# Patient Record
Sex: MALE | Race: WHITE | NOT HISPANIC OR LATINO | Employment: FULL TIME | URBAN - METROPOLITAN AREA
[De-identification: names, ages, dates, MRNs, and addresses within clinical notes are randomized per-mention and may not be internally consistent; named-entity substitution may affect disease eponyms.]

---

## 2017-03-04 PROCEDURE — 96374 THER/PROPH/DIAG INJ IV PUSH: CPT

## 2017-03-04 PROCEDURE — 96361 HYDRATE IV INFUSION ADD-ON: CPT | Mod: 59

## 2017-03-04 PROCEDURE — 99285 EMERGENCY DEPT VISIT HI MDM: CPT | Mod: Z6 | Performed by: EMERGENCY MEDICINE

## 2017-03-04 PROCEDURE — 96375 TX/PRO/DX INJ NEW DRUG ADDON: CPT

## 2017-03-04 PROCEDURE — 99285 EMERGENCY DEPT VISIT HI MDM: CPT | Mod: 25

## 2017-03-05 ENCOUNTER — APPOINTMENT (OUTPATIENT)
Dept: CT IMAGING | Facility: CLINIC | Age: 27
End: 2017-03-05
Attending: EMERGENCY MEDICINE
Payer: COMMERCIAL

## 2017-03-05 ENCOUNTER — HOSPITAL ENCOUNTER (EMERGENCY)
Facility: CLINIC | Age: 27
Discharge: HOME | End: 2017-03-05
Attending: EMERGENCY MEDICINE | Admitting: EMERGENCY MEDICINE
Payer: COMMERCIAL

## 2017-03-05 VITALS
HEIGHT: 66 IN | HEART RATE: 96 BPM | SYSTOLIC BLOOD PRESSURE: 124 MMHG | BODY MASS INDEX: 28.88 KG/M2 | WEIGHT: 179.68 LBS | RESPIRATION RATE: 18 BRPM | TEMPERATURE: 98.6 F | DIASTOLIC BLOOD PRESSURE: 84 MMHG | OXYGEN SATURATION: 97 %

## 2017-03-05 DIAGNOSIS — K51.20 ULCERATIVE PROCTITIS WITHOUT COMPLICATION (H): ICD-10-CM

## 2017-03-05 LAB
ALBUMIN SERPL-MCNC: 3.6 G/DL (ref 3.4–5)
ALP SERPL-CCNC: 79 U/L (ref 40–150)
ALT SERPL W P-5'-P-CCNC: 43 U/L (ref 0–70)
ANION GAP SERPL CALCULATED.3IONS-SCNC: 11 MMOL/L (ref 3–14)
AST SERPL W P-5'-P-CCNC: 25 U/L (ref 0–45)
BASOPHILS # BLD AUTO: 0.1 10E9/L (ref 0–0.2)
BASOPHILS NFR BLD AUTO: 1.2 %
BILIRUB SERPL-MCNC: 0.3 MG/DL (ref 0.2–1.3)
BUN SERPL-MCNC: 13 MG/DL (ref 7–30)
CALCIUM SERPL-MCNC: 9 MG/DL (ref 8.5–10.1)
CHLORIDE SERPL-SCNC: 108 MMOL/L (ref 94–109)
CO2 SERPL-SCNC: 24 MMOL/L (ref 20–32)
CREAT SERPL-MCNC: 1.4 MG/DL (ref 0.66–1.25)
CRP SERPL-MCNC: 24.2 MG/L (ref 0–8)
DIFFERENTIAL METHOD BLD: ABNORMAL
EOSINOPHIL # BLD AUTO: 0.8 10E9/L (ref 0–0.7)
EOSINOPHIL NFR BLD AUTO: 7 %
ERYTHROCYTE [DISTWIDTH] IN BLOOD BY AUTOMATED COUNT: 14.1 % (ref 10–15)
ERYTHROCYTE [SEDIMENTATION RATE] IN BLOOD BY WESTERGREN METHOD: 6 MM/H (ref 0–15)
GFR SERPL CREATININE-BSD FRML MDRD: 60 ML/MIN/1.7M2
GLUCOSE SERPL-MCNC: 120 MG/DL (ref 70–99)
HCT VFR BLD AUTO: 41.9 % (ref 40–53)
HGB BLD-MCNC: 14.5 G/DL (ref 13.3–17.7)
IMM GRANULOCYTES # BLD: 0.1 10E9/L (ref 0–0.4)
IMM GRANULOCYTES NFR BLD: 0.4 %
LYMPHOCYTES # BLD AUTO: 2.6 10E9/L (ref 0.8–5.3)
LYMPHOCYTES NFR BLD AUTO: 22.9 %
MCH RBC QN AUTO: 29.7 PG (ref 26.5–33)
MCHC RBC AUTO-ENTMCNC: 34.6 G/DL (ref 31.5–36.5)
MCV RBC AUTO: 86 FL (ref 78–100)
MONOCYTES # BLD AUTO: 1.2 10E9/L (ref 0–1.3)
MONOCYTES NFR BLD AUTO: 10.7 %
NEUTROPHILS # BLD AUTO: 6.5 10E9/L (ref 1.6–8.3)
NEUTROPHILS NFR BLD AUTO: 57.8 %
NRBC # BLD AUTO: 0 10*3/UL
NRBC BLD AUTO-RTO: 0 /100
PLATELET # BLD AUTO: 364 10E9/L (ref 150–450)
POTASSIUM SERPL-SCNC: 3.9 MMOL/L (ref 3.4–5.3)
PROT SERPL-MCNC: 6.8 G/DL (ref 6.8–8.8)
RBC # BLD AUTO: 4.89 10E12/L (ref 4.4–5.9)
SODIUM SERPL-SCNC: 143 MMOL/L (ref 133–144)
WBC # BLD AUTO: 11.3 10E9/L (ref 4–11)

## 2017-03-05 PROCEDURE — 85652 RBC SED RATE AUTOMATED: CPT | Performed by: EMERGENCY MEDICINE

## 2017-03-05 PROCEDURE — 25000128 H RX IP 250 OP 636: Performed by: EMERGENCY MEDICINE

## 2017-03-05 PROCEDURE — 85025 COMPLETE CBC W/AUTO DIFF WBC: CPT | Performed by: EMERGENCY MEDICINE

## 2017-03-05 PROCEDURE — 86140 C-REACTIVE PROTEIN: CPT | Performed by: EMERGENCY MEDICINE

## 2017-03-05 PROCEDURE — 74177 CT ABD & PELVIS W/CONTRAST: CPT

## 2017-03-05 PROCEDURE — 25500064 ZZH RX 255 OP 636: Performed by: EMERGENCY MEDICINE

## 2017-03-05 PROCEDURE — 80053 COMPREHEN METABOLIC PANEL: CPT | Performed by: EMERGENCY MEDICINE

## 2017-03-05 RX ORDER — HYDROCODONE BITARTRATE AND ACETAMINOPHEN 5; 325 MG/1; MG/1
1-2 TABLET ORAL EVERY 4 HOURS PRN
Qty: 15 TABLET | Refills: 0 | Status: SHIPPED | OUTPATIENT
Start: 2017-03-05 | End: 2018-01-20

## 2017-03-05 RX ORDER — ONDANSETRON 2 MG/ML
4 INJECTION INTRAMUSCULAR; INTRAVENOUS EVERY 30 MIN PRN
Status: DISCONTINUED | OUTPATIENT
Start: 2017-03-05 | End: 2017-03-05 | Stop reason: HOSPADM

## 2017-03-05 RX ORDER — FERROUS SULFATE 325(65) MG
325 TABLET ORAL
COMMUNITY
End: 2018-01-20

## 2017-03-05 RX ORDER — HYDROCODONE BITARTRATE AND ACETAMINOPHEN 5; 325 MG/1; MG/1
1-2 TABLET ORAL EVERY 4 HOURS PRN
Qty: 15 TABLET | Refills: 0 | Status: SHIPPED | OUTPATIENT
Start: 2017-03-05 | End: 2017-03-05

## 2017-03-05 RX ORDER — PREDNISONE 10 MG/1
TABLET ORAL
Qty: 32 TABLET | Refills: 0 | Status: SHIPPED | OUTPATIENT
Start: 2017-03-05 | End: 2017-03-15

## 2017-03-05 RX ORDER — SODIUM CHLORIDE 9 MG/ML
1000 INJECTION, SOLUTION INTRAVENOUS CONTINUOUS
Status: DISCONTINUED | OUTPATIENT
Start: 2017-03-05 | End: 2017-03-05 | Stop reason: HOSPADM

## 2017-03-05 RX ORDER — IOPAMIDOL 755 MG/ML
100 INJECTION, SOLUTION INTRAVASCULAR ONCE
Status: COMPLETED | OUTPATIENT
Start: 2017-03-05 | End: 2017-03-05

## 2017-03-05 RX ORDER — LIDOCAINE 40 MG/G
CREAM TOPICAL
Status: DISCONTINUED | OUTPATIENT
Start: 2017-03-05 | End: 2017-03-05 | Stop reason: HOSPADM

## 2017-03-05 RX ORDER — HYDROMORPHONE HYDROCHLORIDE 1 MG/ML
0.5 INJECTION, SOLUTION INTRAMUSCULAR; INTRAVENOUS; SUBCUTANEOUS
Status: DISCONTINUED | OUTPATIENT
Start: 2017-03-05 | End: 2017-03-05 | Stop reason: HOSPADM

## 2017-03-05 RX ADMIN — SODIUM CHLORIDE 1000 ML: 9 INJECTION, SOLUTION INTRAVENOUS at 00:54

## 2017-03-05 RX ADMIN — SODIUM CHLORIDE 1000 ML: 9 INJECTION, SOLUTION INTRAVENOUS at 02:37

## 2017-03-05 RX ADMIN — ONDANSETRON 4 MG: 2 INJECTION INTRAMUSCULAR; INTRAVENOUS at 00:55

## 2017-03-05 RX ADMIN — HYDROMORPHONE HYDROCHLORIDE 0.5 MG: 10 INJECTION, SOLUTION INTRAMUSCULAR; INTRAVENOUS; SUBCUTANEOUS at 00:54

## 2017-03-05 RX ADMIN — IOPAMIDOL 90 ML: 755 INJECTION, SOLUTION INTRAVENOUS at 01:59

## 2017-03-05 ASSESSMENT — ENCOUNTER SYMPTOMS
RECTAL PAIN: 1
BLOOD IN STOOL: 1
DIARRHEA: 1
SHORTNESS OF BREATH: 0
ABDOMINAL PAIN: 1
FEVER: 0

## 2017-03-05 NOTE — ED AVS SNAPSHOT
[facility], Emergency Department    [address]    [facility] [address]    Phone:  [phone]    Fax:  [phone]                                       Cresencio Del Rosario   MRN: [id number]    Department:  [facility], Emergency Department   Date of Visit:  3/4/2017           After Visit Summary Signature Page     I have received my discharge instructions, and my questions have been answered. I have discussed any challenges I see with this plan with the nurse or doctor.    ..........................................................................................................................................  Patient/Patient Representative Signature      ..........................................................................................................................................  Patient Representative Print Name and Relationship to Patient    ..................................................               ................................................  Date                                            Time    ..........................................................................................................................................  Reviewed by Signature/Title    ...................................................              ..............................................  Date                                                            Time

## 2017-03-05 NOTE — ED PROVIDER NOTES
"  History     Chief Complaint   Patient presents with     GI Problem     Rectal pain, h/o colitis     HPI  Cresencio Del Rosario is a 26 year old male with history of ulcerative colitis who presents with rectal pain.  Patient has a history of ulcerative colitis.  Has been having a flare for the last 2 weeks.  This is consistent of crampy abdominal pain and diarrhea and bloody stool.  Now having worsening rectal pain.  Does have a history of proctitis in the past with his flares.  He is on mesalamine for ulcerative colitis.  He does note he is been taking less than his normal dosage as he recently moved here from [place].  He is working on establishing care here.  Is scheduled to see gastroenterology on Tuesday.  Denies any chest pain, shortness breath or lightheadedness.  Has not had any surgery for his ulcerative colitis.  Last colonoscopy was done in [place] and showed pancolitis.  He is not currently on steroids.    I have reviewed the Medications, Allergies, Past Medical and Surgical History, and Social History in the [facility] [address] system.  Past Medical History   Diagnosis Date     Colitis      Ulcer of upper gastrointestinal tract        Past Surgical History   Procedure Laterality Date     Shoulder surgery  2009     Colonoscopy         No family history on file.    Social History   Substance Use Topics     Smoking status: Former Smoker     Smokeless tobacco: Not on file     Alcohol use No       Review of Systems   Constitutional: Negative for fever.   Respiratory: Negative for shortness of breath.    Cardiovascular: Negative for chest pain.   Gastrointestinal: Positive for abdominal pain, blood in stool, diarrhea and rectal pain.   All other systems reviewed and are negative.      Physical Exam   BP: 127/76  Pulse: 96  Temp: 99.1  F (37.3  C)  Resp: 18  Height: 167.6 cm (5' 6\")  Weight: 81.5 kg (179 lb 10.8 oz)  SpO2: 97 %  Physical Exam  Vital Signs and Nursing Notes Reviewed.  General:  NAD  HEENT: Oropharynx " clear.  No obvious signs of trauma.  PERRL.  EOMI  Neck: Supple.  No lymphadenopathy.  Cardiac: RRR.  No murmurs, rubs or gallops  Lungs: Clear bilaterally.  Normal respiratory rate.    Abdomen:  Soft, Nontender, no rebound or guarding.  Rectal:  Tender over superior aspect of rectum.     Back: No CVA tenderness.  Skin:  No rash.  No diaphoresis  Extremities:  No cyanosis, clubbing, or edema.  Neurological:  CN II-XII intact, Strength intact, Sensation intact, No pronator drift, Normal gait.  Pulse:  Symmetric in bilateral upper and lower extremities.   ED Course     ED Course     Procedures             Critical Care time:  none     Results for orders placed or performed during the hospital encounter of 03/05/17   CT Abdomen Pelvis w Contrast    Narrative    CT ABDOMEN PELVIS WITH CONTRAST   3/5/2017 1:51 AM     HISTORY: Rectal pain. History of ulcerative colitis. Evaluate for  abscess.    TECHNIQUE: 90 mL Isovue-370 IV were administered. After contrast  administration, volumetric helical sections were acquired from the  lung bases to the ischial tuberosities. Coronal images were also  reconstructed. Radiation dose for this scan was reduced using  automated exposure control, adjustment of the mA and/or kV according  to patient size, or iterative reconstruction technique.    COMPARISON: None.     FINDINGS: There is mild bowel wall thickening involving the sigmoid  colon and rectum, most likely infectious or inflammatory in etiology.  The remainder of the colon is unremarkable. Unremarkable appendix. No  free fluid in the pelvis. No bowel obstruction. No fluid collections  in the abdomen or pelvis to suggest abscess. Scattered mildly  prominent lymph nodes are noted in the mesentery adjacent to the  sigmoid colon. Diffuse fatty infiltration of the liver. The  gallbladder, spleen, adrenal glands, pancreas and kidneys are  unremarkable. No hydronephrosis.  The visualized lung bases are clear.      Impression     IMPRESSION:  1. Mild bowel wall thickening involving the sigmoid colon and rectum,  most likely infectious or inflammatory in etiology. Inflammation  related to ulcerative colitis could have this appearance.  2. No evidence for abscess.  3. Diffuse fatty infiltration of the liver.    KELL JAIMES MD   CBC with platelets differential   Result Value Ref Range    WBC 11.3 (H) 4.0 - 11.0 10e9/L    RBC Count 4.89 4.4 - 5.9 10e12/L    Hemoglobin 14.5 13.3 - 17.7 g/dL    Hematocrit 41.9 40.0 - 53.0 %    MCV 86 78 - 100 fl    MCH 29.7 26.5 - 33.0 pg    MCHC 34.6 31.5 - 36.5 g/dL    RDW 14.1 10.0 - 15.0 %    Platelet Count 364 150 - 450 10e9/L    Diff Method Automated Method     % Neutrophils 57.8 %    % Lymphocytes 22.9 %    % Monocytes 10.7 %    % Eosinophils 7.0 %    % Basophils 1.2 %    % Immature Granulocytes 0.4 %    Nucleated RBCs 0 0 /100    Absolute Neutrophil 6.5 1.6 - 8.3 10e9/L    Absolute Lymphocytes 2.6 0.8 - 5.3 10e9/L    Absolute Monocytes 1.2 0.0 - 1.3 10e9/L    Absolute Eosinophils 0.8 (H) 0.0 - 0.7 10e9/L    Absolute Basophils 0.1 0.0 - 0.2 10e9/L    Abs Immature Granulocytes 0.1 0 - 0.4 10e9/L    Absolute Nucleated RBC 0.0    CRP inflammation   Result Value Ref Range    CRP Inflammation 24.2 (H) 0.0 - 8.0 mg/L   Erythrocyte sedimentation rate auto   Result Value Ref Range    Sed Rate 6 0 - 15 mm/h   Comprehensive metabolic panel   Result Value Ref Range    Sodium 143 133 - 144 mmol/L    Potassium 3.9 3.4 - 5.3 mmol/L    Chloride 108 94 - 109 mmol/L    Carbon Dioxide 24 20 - 32 mmol/L    Anion Gap 11 3 - 14 mmol/L    Glucose 120 (H) 70 - 99 mg/dL    Urea Nitrogen 13 7 - 30 mg/dL    Creatinine 1.40 (H) 0.66 - 1.25 mg/dL    GFR Estimate 60 (L) >60 mL/min/1.7m2    GFR Estimate If Black 73 >60 mL/min/1.7m2    Calcium 9.0 8.5 - 10.1 mg/dL    Bilirubin Total 0.3 0.2 - 1.3 mg/dL    Albumin 3.6 3.4 - 5.0 g/dL    Protein Total 6.8 6.8 - 8.8 g/dL    Alkaline Phosphatase 79 40 - 150 U/L    ALT 43 0 - 70 U/L     AST 25 0 - 45 U/L                 Labs Ordered and Resulted from Time of ED Arrival Up to the Time of Departure from the ED   CBC WITH PLATELETS DIFFERENTIAL - Abnormal; Notable for the following:        Result Value    WBC 11.3 (*)     Absolute Eosinophils 0.8 (*)     All other components within normal limits   CRP INFLAMMATION - Abnormal; Notable for the following:     CRP Inflammation 24.2 (*)     All other components within normal limits   COMPREHENSIVE METABOLIC PANEL - Abnormal; Notable for the following:     Glucose 120 (*)     Creatinine 1.40 (*)     GFR Estimate 60 (*)     All other components within normal limits   ERYTHROCYTE SEDIMENTATION RATE AUTO   PERIPHERAL IV CATHETER       Assessments & Plan (with Medical Decision Making)  26 year old with history of ulcerative colitis who comes in with rectal pain.  Labs are notable for an elevated CRP and white count.  Creatinine slightly elevated at 1.4.  Given IV fluids for this.  Did get a CT scan of his abdomen.  No obvious signs of abscess.  There is signs of rectosigmoiditis.  Patient started on prednisone.  He will follow-up with GI on Tuesday.  He will return for any worsening symptoms.  Patient states understanding of discharge home.         I have reviewed the nursing notes.    I have reviewed the findings, diagnosis, plan and need for follow up with the patient.    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

## 2017-03-05 NOTE — DISCHARGE INSTRUCTIONS
Discharge Instructions for Ulcerative Colitis  You have been diagnosed with ulcerative colitis. Ulcerative colitis is inflammation (irritation and swelling) that occurs in the rectum and colon. It is a form of inflammatory bowel disease (IBD). No one knows what causes IBD, but the symptoms can be treated. People with IBD can lead full, active lives.  Home care    Follow the diet that was prescribed for you in the hospital.    Avoid any foods that make your symptoms worse. These foods vary from person to person.    Keep a diary of foods that disagree with you and share this information with your doctor or nutritionist.    Take your medications as directed. The doctor may ask you to take several different types.    Talk to your doctor about the need for surgery. Some patients need to have their colon removed. This treatment has side effects. Only you and your doctor can make this decision.  Follow-up care  Make a follow-up appointment as directed by our staff.  When to seek medical care  Call your doctor immediately if you have any of the following:    Bleeding from your rectum    Worsening pain, new pain, or cramping in your abdomen    Bloody diarrhea    Fever above 101 F (38.0 C)    Weight loss    Nausea    Vomiting     3678-9541 [employer]. [address] [place] [address]. All rights reserved. This information is not intended as a substitute for professional medical care. Always follow your healthcare professional's instructions.

## 2017-03-05 NOTE — ED AVS SNAPSHOT
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        Discharge Instructions         Discharge Instructions for Ulcerative Colitis  You have been diagnosed with ulcerative colitis. Ulcerative colitis is inflammation (irritation and swelling) that occurs in the rectum and colon. It is a form of inflammatory bowel disease (IBD). No one knows what causes IBD, but the symptoms can be treated. People with IBD can lead full, active lives.  Home care    Follow the diet that was prescribed for you in the hospital.    Avoid any foods that make your symptoms worse. These foods vary from person to person.    Keep a diary of foods that disagree with you and share this information with your doctor or nutritionist.    Take your medications as directed. The doctor may ask you to take several different types.    Talk to your doctor about the need for surgery. Some patients need to have their colon removed. This treatment has side effects. Only you and your doctor can make this decision.  Follow-up care  Make a follow-up appointment as directed by our staff.  When to seek medical care  Call your doctor immediately if you have any of the following:    Bleeding from your rectum    Worsening pain, new pain, or cramping in your abdomen    Bloody diarrhea    Fever above 101 F (38.0 C)    Weight loss    Nausea    Vomiting     5865-5180 [employer]. [address] [place] [address], [place] [address]. All rights reserved. This information is not intended as a substitute for professional medical care.  Always follow your healthcare professional's instructions.          24 Hour Appointment Hotline       To make an appointment at any [facility], call [phone] ([phone]). If you don't have a family doctor or clinic, we will help you find one. [facility] clinics are conveniently located to serve the needs of you and your family.             Review of your medicines      START taking        Dose / Directions Last dose taken    HYDROcodone-acetaminophen 5-325 MG per tablet   Commonly known as:  NORCO   Dose:  1-2 tablet   Quantity:  15 tablet        Take 1-2 tablets by mouth every 4 hours as needed for moderate to severe pain   Refills:  0        predniSONE 10 MG tablet   Commonly known as:  DELTASONE   Quantity:  32 tablet        Take 4 tablets daily for 5 days,  take 2 tablets daily for 3 days, take 1 tablet daily for 3 days, take half a tablet for 3 days.   Refills:  0          Our records show that you are taking the medicines listed below. If these are incorrect, please call your family doctor or clinic.        Dose / Directions Last dose taken    ferrous sulfate 325 (65 FE) MG tablet   Commonly known as:  IRON   Dose:  325 mg        Take 325 mg by mouth daily (with breakfast)   Refills:  0        MESALAMINE PO   Dose:  1.2 g        Take 1.2 g by mouth 2 times daily   Refills:  0                Prescriptions were sent or printed at these locations (2 Prescriptions)                   Other Prescriptions                Printed at Department/Unit printer (2 of 2)         predniSONE (DELTASONE) 10 MG tablet               HYDROcodone-acetaminophen (NORCO) 5-325 MG per tablet                Procedures and tests performed during your visit     CBC with platelets differential    CRP inflammation    CT Abdomen Pelvis w Contrast    Comprehensive metabolic panel    Erythrocyte sedimentation rate auto    Peripheral IV catheter      Orders Needing Specimen Collection     None      Pending Results     Date and  "Time Order Name Status Description    3/5/2017 0055 CT Abdomen Pelvis w Contrast Preliminary             Pending Culture Results     No orders found from 3/3/2017 to 3/6/2017.            Thank you for choosing [facility]       Thank you for choosing [facility] for your care. Our goal is always to provide you with excellent care. Hearing back from our patients is one way we can continue to improve our services. Please take a few minutes to complete the written survey that you may receive in the mail after you visit with us. Thank you!        [employer] Information     [employer] lets you send messages to your doctor, view your test results, renew your prescriptions, schedule appointments and more. To sign up, go to [email or web address] . Click on \"Log in\" on the left side of the screen, which will take you to the Welcome page. Then click on \"Sign up Now\" on the right side of the page.     You will be asked to enter the access code listed below, as well as some personal information. Please follow the directions to create your username and password.     Your access code is: [email or web address]  Expires: 6/3/2017  2:47 AM     Your access code will  in 90 days. If you need help or a new code, please call your [facility] clinic or [phone].        Care EveryWhere ID     This is your Care EveryWhere ID. This could be used by other organizations to access your [facility] medical records  [id number]        After Visit Summary       This is your record. Keep this with you and show to your community pharmacist(s) and doctor(s) at your next visit.                  "

## 2018-01-19 ENCOUNTER — HOSPITAL ENCOUNTER (INPATIENT)
Facility: CLINIC | Age: 28
LOS: 3 days | Discharge: HOME | End: 2018-01-23
Attending: EMERGENCY MEDICINE | Admitting: STUDENT IN AN ORGANIZED HEALTH CARE EDUCATION/TRAINING PROGRAM
Payer: COMMERCIAL

## 2018-01-19 DIAGNOSIS — Z87.11 HISTORY OF PEPTIC ULCER DISEASE: Primary | ICD-10-CM

## 2018-01-19 DIAGNOSIS — K51.90 ULCERATIVE COLITIS WITHOUT COMPLICATIONS, UNSPECIFIED LOCATION (H): ICD-10-CM

## 2018-01-19 DIAGNOSIS — R11.2 NAUSEA AND VOMITING, INTRACTABILITY OF VOMITING NOT SPECIFIED, UNSPECIFIED VOMITING TYPE: ICD-10-CM

## 2018-01-19 DIAGNOSIS — E86.0 DEHYDRATION: ICD-10-CM

## 2018-01-19 DIAGNOSIS — R19.7 DIARRHEA, UNSPECIFIED TYPE: ICD-10-CM

## 2018-01-19 PROCEDURE — 93010 ELECTROCARDIOGRAM REPORT: CPT | Mod: Z6 | Performed by: EMERGENCY MEDICINE

## 2018-01-19 PROCEDURE — 96372 THER/PROPH/DIAG INJ SC/IM: CPT

## 2018-01-19 PROCEDURE — 96367 TX/PROPH/DG ADDL SEQ IV INF: CPT

## 2018-01-19 PROCEDURE — 96361 HYDRATE IV INFUSION ADD-ON: CPT

## 2018-01-19 PROCEDURE — 96376 TX/PRO/DX INJ SAME DRUG ADON: CPT

## 2018-01-19 PROCEDURE — 99285 EMERGENCY DEPT VISIT HI MDM: CPT | Mod: 25

## 2018-01-19 PROCEDURE — 99285 EMERGENCY DEPT VISIT HI MDM: CPT | Mod: 25 | Performed by: EMERGENCY MEDICINE

## 2018-01-19 PROCEDURE — 96375 TX/PRO/DX INJ NEW DRUG ADDON: CPT

## 2018-01-19 PROCEDURE — 96365 THER/PROPH/DIAG IV INF INIT: CPT

## 2018-01-19 PROCEDURE — 96366 THER/PROPH/DIAG IV INF ADDON: CPT

## 2018-01-19 NOTE — LETTER
[address] [address]  Phone: [phone]    January 23, 2018      To whom it may concern:    Cresencio Del Rosario was in the hospital from 1/20/18 - 1/23/18. He will be able to return to school on 1/25/18.      Please contact me for questions or concerns.      Sincerely,          Mahin Camarena MD

## 2018-01-19 NOTE — IP AVS SNAPSHOT
MRN:[id number]                      After Visit Summary   1/19/2018    Cresencio Del Rosario    MRN: [id number]           Thank you!     Thank you for choosing [facility] for your care. Our goal is always to provide you with excellent care. Hearing back from our patients is one way we can continue to improve our services. Please take a few minutes to complete the written survey that you may receive in the mail after you visit with us. Thank you!        Patient Information     Date Of Birth          1990        Designated Caregiver       Most Recent Value    Caregiver    Will someone help with your care after discharge? no      About your hospital stay     You were admitted on:  January 20, 2018 You last received care in the:  [address] [facility]    You were discharged on:  January 23, 2018        Reason for your hospital stay       You were in the hospital due to dehydration related to a norovirus infection.                  Who to Call     For medical emergencies, please call 911.  For non-urgent questions about your medical care, please call your primary care provider or clinic, [phone]          Attending Provider     Provider Specialty    Lakia Peacock MD Emergency Medicine    [facility], Christy Bermudez MD Emergency Medicine    [facility], Ayaz Stewart MD Internal Medicine    Anton, Cain Kirkpatrick MD Internal Medicine    uH[employer], Grace ALMANZAR MD Internal Medicine       Primary Care Provider Office Phone # Fax #    [facility] [phone]      After Care Instructions     Activity       Your activity upon discharge: activity as tolerated            Diet       Follow this diet upon discharge: Orders Placed This Encounter      Regular Diet Adult            Discharge Instructions       Continue your home therapies for ulcerative colitis. You should also take daily omeprazole for the next 6-8 weeks (prescribed on discharge). You can make an appointment with you  "primary care doctor in the next 1-2 weeks and GI as previously planned.                  Follow-up Appointments     Follow Up and recommended labs and tests       Follow up with [facility] PCP in 1-2 weeks and as previously planned with [facility] GI                  Pending Results     Date and Time Order Name Status Description    2018 1026 Blood culture Preliminary     2018 1026 Blood culture Preliminary     2018 0804 Blood culture Preliminary     2018 0804 Blood culture Preliminary             Statement of Approval     Ordered          18 1309  I have reviewed and agree with all the recommendations and orders detailed in this document.  EFFECTIVE NOW     Approved and electronically signed by:  Mahin Camarena MD             Admission Information     Date & Time Provider Department Dept. Phone    2018 Grace Arias MD [address] [facility] [phone]      Your Vitals Were     Blood Pressure Pulse Temperature Respirations Height Weight    120/92 (BP Location: Left arm) 83 97.3  F (36.3  C) (Oral) 16 1.676 m (5' 6\") 87.9 kg (193 lb 11.2 oz)    Pulse Oximetry BMI (Body Mass Index)                96% 31.26 kg/m2          [employer] Information     [employer] lets you send messages to your doctor, view your test results, renew your prescriptions, schedule appointments and more. To sign up, go to [email or web address] . Click on \"Log in\" on the left side of the screen, which will take you to the Welcome page. Then click on \"Sign up Now\" on the right side of the page.     You will be asked to enter the access code listed below, as well as some personal information. Please follow the directions to create your username and password.     Your access code is: [email or web address]  Expires: 2018  1:11 PM     Your access code will  in 90 days. If you need help or a new code, please call your [facility] clinic or [phone].        Care EveryWhere ID     This is your Care " EveryWhere ID. This could be used by other organizations to access your [facility] medical records  [id number]        Equal Access to Services     NIDIA GRAHAM : Franklyn Buitrago, wabrunada nikkieadaha, kinaluz mccormickbrandonda anne mariegiuseppemame, waxrebecca emily eldaairam gongorahi soraidatima yee marcus. So [facility] [phone].    ATENCIÓN: Si habla español, tiene a cain disposición servicios gratuitos de asistencia lingüística. Santaame al [phone].    We comply with applicable federal civil rights laws and [place] laws. We do not discriminate on the basis of race, color, national origin, age, disability, sex, sexual orientation, or gender identity.               Review of your medicines      CONTINUE these medicines which may have CHANGED, or have new prescriptions. If we are uncertain of the size of tablets/capsules you have at home, strength may be listed as something that might have changed.        Dose / Directions    omeprazole 20 MG CR capsule   Commonly known as:  priLOSEC   This may have changed:    - medication strength  - how much to take   Used for:  History of peptic ulcer disease        Dose:  20 mg   Take 1 capsule (20 mg) by mouth daily   Quantity:  30 capsule   Refills:  1         CONTINUE these medicines which have NOT CHANGED        Dose / Directions    * LIALDA 1.2 G EC tablet   Generic drug:  mesalamine        Dose:  2400 mg   Take 2,400 mg by mouth daily (with breakfast)   Refills:  0       * mesalamine 1000 MG Suppository   Commonly known as:  CANASA        Dose:  1000 mg   Place 1,000 mg rectally nightly as needed   Refills:  0       * Notice:  This list has 2 medication(s) that are the same as other medications prescribed for you. Read the directions carefully, and ask your doctor or other care provider to review them with you.         Where to get your medicines      These medications were sent to [facility] [place], [place] - [address]  [address] [facility] [address] [address]      Phone:  [phone]     omeprazole 20 MG CR capsule                Protect others around you: Learn how to safely use, store and throw away your medicines at www.disposemymeds.org.             Medication List: This is a list of all your medications and when to take them. Check marks below indicate your daily home schedule. Keep this list as a reference.      Medications           Morning Afternoon Evening Bedtime As Needed    * LIALDA 1.2 G EC tablet   Take 2,400 mg by mouth daily (with breakfast)   Last time this was given:  2,400 mg on 1/23/2018  9:32 AM   Generic drug:  mesalamine                                * mesalamine 1000 MG Suppository   Commonly known as:  CANASA   Place 1,000 mg rectally nightly as needed                                omeprazole 20 MG CR capsule   Commonly known as:  priLOSEC   Take 1 capsule (20 mg) by mouth daily                                * Notice:  This list has 2 medication(s) that are the same as other medications prescribed for you. Read the directions carefully, and ask your doctor or other care provider to review them with you.

## 2018-01-19 NOTE — IP AVS SNAPSHOT
[address] [address]    Phone:  [phone]                                       After Visit Summary   1/19/2018    Cresencio Del Rosario    MRN: [id number]           After Visit Summary Signature Page     I have received my discharge instructions, and my questions have been answered. I have discussed any challenges I see with this plan with the nurse or doctor.    ..........................................................................................................................................  Patient/Patient Representative Signature      ..........................................................................................................................................  Patient Representative Print Name and Relationship to Patient    ..................................................               ................................................  Date                                            Time    ..........................................................................................................................................  Reviewed by Signature/Title    ...................................................              ..............................................  Date                                                            Time

## 2018-01-19 NOTE — LETTER
Transition Communication Hand-off for Care Transitions to Next Level of Care Provider    Name: Cresencio Del Rosario  MRN #: [id number]  [facility]     Primary Clinic: [address]  [facility] [address]     Reason for Hospitalization:  Dehydration [E86.0]  Ulcerative colitis without complications, unspecified location (H) [K51.90]  Diarrhea, unspecified type [R19.7]  Nausea and vomiting, intractability of vomiting not specified, unspecified vomiting type [R11.2]  Admit Date/Time: 1/19/2018 11:59 PM  Discharge Date: 1/23/18  Payor Source: Payor: BLUE PLUS / Plan: BLUE PLUS MA / Product Type: HMO /     Reason for Communication Hand-off Referral: continuation of cares    Discharge Plan: home       Concern for non-adherence with plan of care:   Y/N n  Discharge Needs Assessment:      Already enrolled in Tele-monitoring program and name of program:    Follow-up specialty is recommended: Yes    Follow-up plan:  No future appointments.    Any outstanding tests or procedures:              Key Recommendations:      Britt Romo    AVS/Discharge Summary is the source of truth; this is a helpful guide for improved communication of patient story

## 2018-01-20 ENCOUNTER — APPOINTMENT (OUTPATIENT)
Dept: CT IMAGING | Facility: CLINIC | Age: 28
End: 2018-01-20
Attending: EMERGENCY MEDICINE
Payer: COMMERCIAL

## 2018-01-20 PROBLEM — A08.11 NOROVIRUS: Status: ACTIVE | Noted: 2018-01-20

## 2018-01-20 LAB
ALBUMIN SERPL-MCNC: 3.3 G/DL (ref 3.4–5)
ALBUMIN SERPL-MCNC: 4.2 G/DL (ref 3.4–5)
ALBUMIN UR-MCNC: NEGATIVE MG/DL
ALP SERPL-CCNC: 123 U/L (ref 40–150)
ALP SERPL-CCNC: 95 U/L (ref 40–150)
ALT SERPL W P-5'-P-CCNC: 49 U/L (ref 0–70)
ALT SERPL W P-5'-P-CCNC: 62 U/L (ref 0–70)
ANION GAP SERPL CALCULATED.3IONS-SCNC: 10 MMOL/L (ref 3–14)
ANION GAP SERPL CALCULATED.3IONS-SCNC: 11 MMOL/L (ref 3–14)
ANION GAP SERPL CALCULATED.3IONS-SCNC: 12 MMOL/L (ref 3–14)
APPEARANCE UR: CLEAR
AST SERPL W P-5'-P-CCNC: 21 U/L (ref 0–45)
AST SERPL W P-5'-P-CCNC: 32 U/L (ref 0–45)
BASOPHILS # BLD AUTO: 0.1 10E9/L (ref 0–0.2)
BASOPHILS # BLD AUTO: 0.1 10E9/L (ref 0–0.2)
BASOPHILS NFR BLD AUTO: 0.2 %
BASOPHILS NFR BLD AUTO: 0.5 %
BILIRUB SERPL-MCNC: 0.4 MG/DL (ref 0.2–1.3)
BILIRUB SERPL-MCNC: 0.6 MG/DL (ref 0.2–1.3)
BILIRUB UR QL STRIP: NEGATIVE
BUN SERPL-MCNC: 15 MG/DL (ref 7–30)
BUN SERPL-MCNC: 19 MG/DL (ref 7–30)
BUN SERPL-MCNC: 21 MG/DL (ref 7–30)
C COLI+JEJUNI+LARI FUSA STL QL NAA+PROBE: NOT DETECTED
C DIFF TOX B STL QL: NEGATIVE
CALCIUM SERPL-MCNC: 7.7 MG/DL (ref 8.5–10.1)
CALCIUM SERPL-MCNC: 7.8 MG/DL (ref 8.5–10.1)
CALCIUM SERPL-MCNC: 9.2 MG/DL (ref 8.5–10.1)
CHLORIDE SERPL-SCNC: 109 MMOL/L (ref 94–109)
CHLORIDE SERPL-SCNC: 115 MMOL/L (ref 94–109)
CHLORIDE SERPL-SCNC: 117 MMOL/L (ref 94–109)
CO2 SERPL-SCNC: 17 MMOL/L (ref 20–32)
CO2 SERPL-SCNC: 18 MMOL/L (ref 20–32)
CO2 SERPL-SCNC: 21 MMOL/L (ref 20–32)
COLOR UR AUTO: YELLOW
CREAT SERPL-MCNC: 0.88 MG/DL (ref 0.66–1.25)
CREAT SERPL-MCNC: 0.96 MG/DL (ref 0.66–1.25)
CREAT SERPL-MCNC: 0.97 MG/DL (ref 0.66–1.25)
CRP SERPL-MCNC: 140 MG/L (ref 0–8)
DIFFERENTIAL METHOD BLD: ABNORMAL
DIFFERENTIAL METHOD BLD: ABNORMAL
EC STX1 GENE STL QL NAA+PROBE: NOT DETECTED
EC STX2 GENE STL QL NAA+PROBE: NOT DETECTED
ENTERIC PATHOGEN COMMENT: ABNORMAL
EOSINOPHIL # BLD AUTO: 0 10E9/L (ref 0–0.7)
EOSINOPHIL # BLD AUTO: 0.3 10E9/L (ref 0–0.7)
EOSINOPHIL NFR BLD AUTO: 0 %
EOSINOPHIL NFR BLD AUTO: 1.2 %
ERYTHROCYTE [DISTWIDTH] IN BLOOD BY AUTOMATED COUNT: 16.5 % (ref 10–15)
ERYTHROCYTE [DISTWIDTH] IN BLOOD BY AUTOMATED COUNT: 16.8 % (ref 10–15)
ERYTHROCYTE [DISTWIDTH] IN BLOOD BY AUTOMATED COUNT: 17.5 % (ref 10–15)
ERYTHROCYTE [SEDIMENTATION RATE] IN BLOOD BY WESTERGREN METHOD: 3 MM/H (ref 0–15)
FLUAV+FLUBV AG SPEC QL: NEGATIVE
FLUAV+FLUBV AG SPEC QL: NEGATIVE
GFR SERPL CREATININE-BSD FRML MDRD: >90 ML/MIN/1.7M2
GLUCOSE SERPL-MCNC: 106 MG/DL (ref 70–99)
GLUCOSE SERPL-MCNC: 134 MG/DL (ref 70–99)
GLUCOSE SERPL-MCNC: 160 MG/DL (ref 70–99)
GLUCOSE UR STRIP-MCNC: NEGATIVE MG/DL
HCT VFR BLD AUTO: 42.8 % (ref 40–53)
HCT VFR BLD AUTO: 45.6 % (ref 40–53)
HCT VFR BLD AUTO: 46.6 % (ref 40–53)
HGB BLD-MCNC: 13.7 G/DL (ref 13.3–17.7)
HGB BLD-MCNC: 15.1 G/DL (ref 13.3–17.7)
HGB BLD-MCNC: 15.5 G/DL (ref 13.3–17.7)
HGB UR QL STRIP: NEGATIVE
HYALINE CASTS #/AREA URNS LPF: 1 /LPF (ref 0–2)
IMM GRANULOCYTES # BLD: 0.1 10E9/L (ref 0–0.4)
IMM GRANULOCYTES # BLD: 0.1 10E9/L (ref 0–0.4)
IMM GRANULOCYTES NFR BLD: 0.4 %
IMM GRANULOCYTES NFR BLD: 0.5 %
INR PPP: 1.44 (ref 0.86–1.14)
KETONES UR STRIP-MCNC: NEGATIVE MG/DL
LACTATE BLD-SCNC: 1.7 MMOL/L (ref 0.7–2)
LACTATE BLD-SCNC: 3.5 MMOL/L (ref 0.7–2)
LEUKOCYTE ESTERASE UR QL STRIP: NEGATIVE
LIPASE SERPL-CCNC: 376 U/L (ref 73–393)
LYMPHOCYTES # BLD AUTO: 0.9 10E9/L (ref 0.8–5.3)
LYMPHOCYTES # BLD AUTO: 1.8 10E9/L (ref 0.8–5.3)
LYMPHOCYTES NFR BLD AUTO: 3.9 %
LYMPHOCYTES NFR BLD AUTO: 8.2 %
MAGNESIUM SERPL-MCNC: 1.4 MG/DL (ref 1.6–2.3)
MAGNESIUM SERPL-MCNC: 1.5 MG/DL (ref 1.6–2.3)
MAGNESIUM SERPL-MCNC: 1.8 MG/DL (ref 1.6–2.3)
MCH RBC QN AUTO: 27.1 PG (ref 26.5–33)
MCH RBC QN AUTO: 27.3 PG (ref 26.5–33)
MCH RBC QN AUTO: 27.8 PG (ref 26.5–33)
MCHC RBC AUTO-ENTMCNC: 32 G/DL (ref 31.5–36.5)
MCHC RBC AUTO-ENTMCNC: 33.1 G/DL (ref 31.5–36.5)
MCHC RBC AUTO-ENTMCNC: 33.3 G/DL (ref 31.5–36.5)
MCV RBC AUTO: 82 FL (ref 78–100)
MCV RBC AUTO: 84 FL (ref 78–100)
MCV RBC AUTO: 85 FL (ref 78–100)
MONOCYTES # BLD AUTO: 0.8 10E9/L (ref 0–1.3)
MONOCYTES # BLD AUTO: 1.5 10E9/L (ref 0–1.3)
MONOCYTES NFR BLD AUTO: 3.5 %
MONOCYTES NFR BLD AUTO: 6.8 %
MUCOUS THREADS #/AREA URNS LPF: PRESENT /LPF
NEUTROPHILS # BLD AUTO: 17.9 10E9/L (ref 1.6–8.3)
NEUTROPHILS # BLD AUTO: 20.5 10E9/L (ref 1.6–8.3)
NEUTROPHILS NFR BLD AUTO: 82.8 %
NEUTROPHILS NFR BLD AUTO: 92 %
NITRATE UR QL: NEGATIVE
NOROV GI+II ORF1-ORF2 JNC STL QL NAA+PR: ABNORMAL
NRBC # BLD AUTO: 0 10*3/UL
NRBC # BLD AUTO: 0 10*3/UL
NRBC BLD AUTO-RTO: 0 /100
NRBC BLD AUTO-RTO: 0 /100
PH UR STRIP: 5 PH (ref 5–7)
PLATELET # BLD AUTO: 293 10E9/L (ref 150–450)
PLATELET # BLD AUTO: 331 10E9/L (ref 150–450)
PLATELET # BLD AUTO: 420 10E9/L (ref 150–450)
POTASSIUM SERPL-SCNC: 3.3 MMOL/L (ref 3.4–5.3)
POTASSIUM SERPL-SCNC: 3.7 MMOL/L (ref 3.4–5.3)
POTASSIUM SERPL-SCNC: 4 MMOL/L (ref 3.4–5.3)
POTASSIUM SERPL-SCNC: 4.4 MMOL/L (ref 3.4–5.3)
PROT SERPL-MCNC: 6.6 G/DL (ref 6.8–8.8)
PROT SERPL-MCNC: 7.9 G/DL (ref 6.8–8.8)
RBC # BLD AUTO: 5.02 10E12/L (ref 4.4–5.9)
RBC # BLD AUTO: 5.57 10E12/L (ref 4.4–5.9)
RBC # BLD AUTO: 5.58 10E12/L (ref 4.4–5.9)
RBC #/AREA URNS AUTO: <1 /HPF (ref 0–2)
RVA NSP5 STL QL NAA+PROBE: NOT DETECTED
SALMONELLA SP RPOD STL QL NAA+PROBE: NOT DETECTED
SHIGELLA SP+EIEC IPAH STL QL NAA+PROBE: NOT DETECTED
SODIUM SERPL-SCNC: 142 MMOL/L (ref 133–144)
SODIUM SERPL-SCNC: 143 MMOL/L (ref 133–144)
SODIUM SERPL-SCNC: 145 MMOL/L (ref 133–144)
SOURCE: ABNORMAL
SP GR UR STRIP: 1.03 (ref 1–1.03)
SPECIMEN SOURCE: NORMAL
SPECIMEN SOURCE: NORMAL
UROBILINOGEN UR STRIP-MCNC: NORMAL MG/DL (ref 0–2)
V CHOL+PARA RFBL+TRKH+TNAA STL QL NAA+PR: NOT DETECTED
WBC # BLD AUTO: 13.9 10E9/L (ref 4–11)
WBC # BLD AUTO: 21.6 10E9/L (ref 4–11)
WBC # BLD AUTO: 22.3 10E9/L (ref 4–11)
WBC #/AREA URNS AUTO: 1 /HPF (ref 0–2)
Y ENTERO RECN STL QL NAA+PROBE: NOT DETECTED

## 2018-01-20 PROCEDURE — 25000132 ZZH RX MED GY IP 250 OP 250 PS 637: Performed by: STUDENT IN AN ORGANIZED HEALTH CARE EDUCATION/TRAINING PROGRAM

## 2018-01-20 PROCEDURE — 99223 1ST HOSP IP/OBS HIGH 75: CPT | Mod: AI | Performed by: STUDENT IN AN ORGANIZED HEALTH CARE EDUCATION/TRAINING PROGRAM

## 2018-01-20 PROCEDURE — 87804 INFLUENZA ASSAY W/OPTIC: CPT | Performed by: EMERGENCY MEDICINE

## 2018-01-20 PROCEDURE — 36415 COLL VENOUS BLD VENIPUNCTURE: CPT | Performed by: STUDENT IN AN ORGANIZED HEALTH CARE EDUCATION/TRAINING PROGRAM

## 2018-01-20 PROCEDURE — 40000802 ZZH SITE CHECK

## 2018-01-20 PROCEDURE — 85652 RBC SED RATE AUTOMATED: CPT | Performed by: STUDENT IN AN ORGANIZED HEALTH CARE EDUCATION/TRAINING PROGRAM

## 2018-01-20 PROCEDURE — 25000128 H RX IP 250 OP 636: Performed by: EMERGENCY MEDICINE

## 2018-01-20 PROCEDURE — 85027 COMPLETE CBC AUTOMATED: CPT | Performed by: STUDENT IN AN ORGANIZED HEALTH CARE EDUCATION/TRAINING PROGRAM

## 2018-01-20 PROCEDURE — 80048 BASIC METABOLIC PNL TOTAL CA: CPT | Performed by: STUDENT IN AN ORGANIZED HEALTH CARE EDUCATION/TRAINING PROGRAM

## 2018-01-20 PROCEDURE — 83735 ASSAY OF MAGNESIUM: CPT | Performed by: STUDENT IN AN ORGANIZED HEALTH CARE EDUCATION/TRAINING PROGRAM

## 2018-01-20 PROCEDURE — 81001 URINALYSIS AUTO W/SCOPE: CPT | Performed by: EMERGENCY MEDICINE

## 2018-01-20 PROCEDURE — 25000125 ZZHC RX 250: Performed by: EMERGENCY MEDICINE

## 2018-01-20 PROCEDURE — 83605 ASSAY OF LACTIC ACID: CPT | Performed by: STUDENT IN AN ORGANIZED HEALTH CARE EDUCATION/TRAINING PROGRAM

## 2018-01-20 PROCEDURE — 25000132 ZZH RX MED GY IP 250 OP 250 PS 637: Performed by: EMERGENCY MEDICINE

## 2018-01-20 PROCEDURE — 83690 ASSAY OF LIPASE: CPT | Performed by: EMERGENCY MEDICINE

## 2018-01-20 PROCEDURE — 85610 PROTHROMBIN TIME: CPT | Performed by: STUDENT IN AN ORGANIZED HEALTH CARE EDUCATION/TRAINING PROGRAM

## 2018-01-20 PROCEDURE — 25000128 H RX IP 250 OP 636

## 2018-01-20 PROCEDURE — 25000128 H RX IP 250 OP 636: Performed by: PHYSICIAN ASSISTANT

## 2018-01-20 PROCEDURE — 74177 CT ABD & PELVIS W/CONTRAST: CPT

## 2018-01-20 PROCEDURE — 12000001 ZZH R&B MED SURG/OB UMMC

## 2018-01-20 PROCEDURE — 80053 COMPREHEN METABOLIC PANEL: CPT | Performed by: EMERGENCY MEDICINE

## 2018-01-20 PROCEDURE — 86140 C-REACTIVE PROTEIN: CPT | Performed by: STUDENT IN AN ORGANIZED HEALTH CARE EDUCATION/TRAINING PROGRAM

## 2018-01-20 PROCEDURE — 87086 URINE CULTURE/COLONY COUNT: CPT | Performed by: EMERGENCY MEDICINE

## 2018-01-20 PROCEDURE — 87040 BLOOD CULTURE FOR BACTERIA: CPT | Performed by: EMERGENCY MEDICINE

## 2018-01-20 PROCEDURE — 83605 ASSAY OF LACTIC ACID: CPT | Performed by: EMERGENCY MEDICINE

## 2018-01-20 PROCEDURE — 85025 COMPLETE CBC W/AUTO DIFF WBC: CPT | Performed by: EMERGENCY MEDICINE

## 2018-01-20 PROCEDURE — 84132 ASSAY OF SERUM POTASSIUM: CPT | Performed by: STUDENT IN AN ORGANIZED HEALTH CARE EDUCATION/TRAINING PROGRAM

## 2018-01-20 PROCEDURE — 87506 IADNA-DNA/RNA PROBE TQ 6-11: CPT | Performed by: EMERGENCY MEDICINE

## 2018-01-20 PROCEDURE — 25000128 H RX IP 250 OP 636: Performed by: STUDENT IN AN ORGANIZED HEALTH CARE EDUCATION/TRAINING PROGRAM

## 2018-01-20 PROCEDURE — 87493 C DIFF AMPLIFIED PROBE: CPT | Performed by: EMERGENCY MEDICINE

## 2018-01-20 PROCEDURE — 83735 ASSAY OF MAGNESIUM: CPT | Performed by: EMERGENCY MEDICINE

## 2018-01-20 RX ORDER — CIPROFLOXACIN 2 MG/ML
400 INJECTION, SOLUTION INTRAVENOUS EVERY 12 HOURS
Status: DISCONTINUED | OUTPATIENT
Start: 2018-01-20 | End: 2018-01-20

## 2018-01-20 RX ORDER — NALOXONE HYDROCHLORIDE 0.4 MG/ML
.1-.4 INJECTION, SOLUTION INTRAMUSCULAR; INTRAVENOUS; SUBCUTANEOUS
Status: DISCONTINUED | OUTPATIENT
Start: 2018-01-20 | End: 2018-01-23 | Stop reason: HOSPADM

## 2018-01-20 RX ORDER — ONDANSETRON 2 MG/ML
8 INJECTION INTRAMUSCULAR; INTRAVENOUS ONCE
Status: COMPLETED | OUTPATIENT
Start: 2018-01-20 | End: 2018-01-20

## 2018-01-20 RX ORDER — SODIUM CHLORIDE 9 MG/ML
INJECTION, SOLUTION INTRAVENOUS CONTINUOUS
Status: DISCONTINUED | OUTPATIENT
Start: 2018-01-20 | End: 2018-01-20

## 2018-01-20 RX ORDER — LIDOCAINE 40 MG/G
CREAM TOPICAL
Status: DISCONTINUED | OUTPATIENT
Start: 2018-01-20 | End: 2018-01-23 | Stop reason: HOSPADM

## 2018-01-20 RX ORDER — ACETAMINOPHEN 500 MG
1000 TABLET ORAL ONCE
Status: COMPLETED | OUTPATIENT
Start: 2018-01-20 | End: 2018-01-20

## 2018-01-20 RX ORDER — POTASSIUM CHLORIDE 1.5 G/1.58G
20-40 POWDER, FOR SOLUTION ORAL
Status: DISCONTINUED | OUTPATIENT
Start: 2018-01-20 | End: 2018-01-23 | Stop reason: HOSPADM

## 2018-01-20 RX ORDER — MESALAMINE 1.2 G/1
2400 TABLET, DELAYED RELEASE ORAL
COMMUNITY

## 2018-01-20 RX ORDER — POTASSIUM CHLORIDE 750 MG/1
20-40 TABLET, EXTENDED RELEASE ORAL
Status: DISCONTINUED | OUTPATIENT
Start: 2018-01-20 | End: 2018-01-23 | Stop reason: HOSPADM

## 2018-01-20 RX ORDER — ONDANSETRON 2 MG/ML
4 INJECTION INTRAMUSCULAR; INTRAVENOUS EVERY 6 HOURS PRN
Status: DISCONTINUED | OUTPATIENT
Start: 2018-01-20 | End: 2018-01-23 | Stop reason: HOSPADM

## 2018-01-20 RX ORDER — POTASSIUM CHLORIDE 29.8 MG/ML
20 INJECTION INTRAVENOUS
Status: DISCONTINUED | OUTPATIENT
Start: 2018-01-20 | End: 2018-01-23 | Stop reason: HOSPADM

## 2018-01-20 RX ORDER — MESALAMINE 1000 MG/1
1000 SUPPOSITORY RECTAL
COMMUNITY

## 2018-01-20 RX ORDER — SODIUM CHLORIDE, SODIUM LACTATE, POTASSIUM CHLORIDE, CALCIUM CHLORIDE 600; 310; 30; 20 MG/100ML; MG/100ML; MG/100ML; MG/100ML
1000 INJECTION, SOLUTION INTRAVENOUS CONTINUOUS
Status: DISCONTINUED | OUTPATIENT
Start: 2018-01-20 | End: 2018-01-20

## 2018-01-20 RX ORDER — SODIUM CHLORIDE, SODIUM LACTATE, POTASSIUM CHLORIDE, CALCIUM CHLORIDE 600; 310; 30; 20 MG/100ML; MG/100ML; MG/100ML; MG/100ML
INJECTION, SOLUTION INTRAVENOUS
Status: COMPLETED
Start: 2018-01-20 | End: 2018-01-20

## 2018-01-20 RX ORDER — POTASSIUM CHLORIDE 7.45 MG/ML
10 INJECTION INTRAVENOUS
Status: DISCONTINUED | OUTPATIENT
Start: 2018-01-20 | End: 2018-01-23 | Stop reason: HOSPADM

## 2018-01-20 RX ORDER — PROCHLORPERAZINE 25 MG
25 SUPPOSITORY, RECTAL RECTAL EVERY 12 HOURS PRN
Status: DISCONTINUED | OUTPATIENT
Start: 2018-01-20 | End: 2018-01-23 | Stop reason: HOSPADM

## 2018-01-20 RX ORDER — HYDROMORPHONE HYDROCHLORIDE 1 MG/ML
0.5 INJECTION, SOLUTION INTRAMUSCULAR; INTRAVENOUS; SUBCUTANEOUS ONCE
Status: COMPLETED | OUTPATIENT
Start: 2018-01-20 | End: 2018-01-20

## 2018-01-20 RX ORDER — OLANZAPINE 10 MG/2ML
5 INJECTION, POWDER, FOR SOLUTION INTRAMUSCULAR ONCE
Status: COMPLETED | OUTPATIENT
Start: 2018-01-20 | End: 2018-01-20

## 2018-01-20 RX ORDER — MESALAMINE 1.2 G/1
2400 TABLET, DELAYED RELEASE ORAL
Status: DISCONTINUED | OUTPATIENT
Start: 2018-01-20 | End: 2018-01-23 | Stop reason: HOSPADM

## 2018-01-20 RX ORDER — PROCHLORPERAZINE MALEATE 10 MG
10 TABLET ORAL EVERY 6 HOURS PRN
Status: DISCONTINUED | OUTPATIENT
Start: 2018-01-20 | End: 2018-01-23 | Stop reason: HOSPADM

## 2018-01-20 RX ORDER — MAGNESIUM SULFATE HEPTAHYDRATE 40 MG/ML
4 INJECTION, SOLUTION INTRAVENOUS EVERY 4 HOURS PRN
Status: DISCONTINUED | OUTPATIENT
Start: 2018-01-20 | End: 2018-01-23 | Stop reason: HOSPADM

## 2018-01-20 RX ORDER — POTASSIUM CL/LIDO/0.9 % NACL 10MEQ/0.1L
10 INTRAVENOUS SOLUTION, PIGGYBACK (ML) INTRAVENOUS
Status: DISCONTINUED | OUTPATIENT
Start: 2018-01-20 | End: 2018-01-23 | Stop reason: HOSPADM

## 2018-01-20 RX ORDER — ONDANSETRON 4 MG/1
4 TABLET, ORALLY DISINTEGRATING ORAL EVERY 6 HOURS PRN
Status: DISCONTINUED | OUTPATIENT
Start: 2018-01-20 | End: 2018-01-23 | Stop reason: HOSPADM

## 2018-01-20 RX ORDER — IOPAMIDOL 755 MG/ML
100 INJECTION, SOLUTION INTRAVASCULAR ONCE
Status: COMPLETED | OUTPATIENT
Start: 2018-01-20 | End: 2018-01-20

## 2018-01-20 RX ORDER — HYDROMORPHONE HYDROCHLORIDE 1 MG/ML
0.5 INJECTION, SOLUTION INTRAMUSCULAR; INTRAVENOUS; SUBCUTANEOUS
Status: COMPLETED | OUTPATIENT
Start: 2018-01-20 | End: 2018-01-20

## 2018-01-20 RX ADMIN — ONDANSETRON 8 MG: 2 INJECTION INTRAMUSCULAR; INTRAVENOUS at 06:52

## 2018-01-20 RX ADMIN — MESALAMINE 2400 MG: 1.2 TABLET, DELAYED RELEASE ORAL at 22:51

## 2018-01-20 RX ADMIN — SODIUM CHLORIDE: 9 INJECTION, SOLUTION INTRAVENOUS at 08:12

## 2018-01-20 RX ADMIN — FAMOTIDINE 20 MG: 10 INJECTION, SOLUTION INTRAVENOUS at 01:23

## 2018-01-20 RX ADMIN — Medication 0.5 MG: at 02:15

## 2018-01-20 RX ADMIN — OLANZAPINE 5 MG: 10 INJECTION, POWDER, LYOPHILIZED, FOR SOLUTION INTRAMUSCULAR at 02:44

## 2018-01-20 RX ADMIN — ONDANSETRON 8 MG: 2 INJECTION INTRAMUSCULAR; INTRAVENOUS at 01:22

## 2018-01-20 RX ADMIN — CIPROFLOXACIN 400 MG: 2 INJECTION, SOLUTION INTRAVENOUS at 10:34

## 2018-01-20 RX ADMIN — MAGNESIUM SULFATE IN WATER 4 G: 40 INJECTION, SOLUTION INTRAVENOUS at 22:29

## 2018-01-20 RX ADMIN — SODIUM CHLORIDE, POTASSIUM CHLORIDE, SODIUM LACTATE AND CALCIUM CHLORIDE 1000 ML: 600; 310; 30; 20 INJECTION, SOLUTION INTRAVENOUS at 08:39

## 2018-01-20 RX ADMIN — METRONIDAZOLE 500 MG: 500 INJECTION, SOLUTION INTRAVENOUS at 09:03

## 2018-01-20 RX ADMIN — SODIUM CHLORIDE, SODIUM LACTATE, POTASSIUM CHLORIDE, CALCIUM CHLORIDE 500 ML: 600; 310; 30; 20 INJECTION, SOLUTION INTRAVENOUS at 10:27

## 2018-01-20 RX ADMIN — ACETAMINOPHEN 1000 MG: 500 TABLET, FILM COATED ORAL at 16:23

## 2018-01-20 RX ADMIN — SODIUM CHLORIDE 1000 ML: 9 INJECTION, SOLUTION INTRAVENOUS at 01:23

## 2018-01-20 RX ADMIN — SODIUM CHLORIDE, POTASSIUM CHLORIDE, SODIUM LACTATE AND CALCIUM CHLORIDE 1000 ML: 600; 310; 30; 20 INJECTION, SOLUTION INTRAVENOUS at 13:18

## 2018-01-20 RX ADMIN — IOPAMIDOL 90 ML: 755 INJECTION, SOLUTION INTRAVENOUS at 03:37

## 2018-01-20 RX ADMIN — Medication 0.5 MG: at 10:56

## 2018-01-20 RX ADMIN — PROCHLORPERAZINE EDISYLATE 5 MG: 5 INJECTION INTRAMUSCULAR; INTRAVENOUS at 15:11

## 2018-01-20 RX ADMIN — SODIUM CHLORIDE, POTASSIUM CHLORIDE, SODIUM LACTATE AND CALCIUM CHLORIDE 1000 ML: 600; 310; 30; 20 INJECTION, SOLUTION INTRAVENOUS at 14:28

## 2018-01-20 RX ADMIN — SODIUM CHLORIDE 1000 ML: 9 INJECTION, SOLUTION INTRAVENOUS at 04:50

## 2018-01-20 RX ADMIN — SODIUM CHLORIDE, POTASSIUM CHLORIDE, SODIUM LACTATE AND CALCIUM CHLORIDE 500 ML: 600; 310; 30; 20 INJECTION, SOLUTION INTRAVENOUS at 10:27

## 2018-01-20 RX ADMIN — LIDOCAINE HYDROCHLORIDE 30 ML: 20 SOLUTION ORAL; TOPICAL at 01:22

## 2018-01-20 RX ADMIN — SODIUM CHLORIDE 1000 ML: 9 INJECTION, SOLUTION INTRAVENOUS at 03:42

## 2018-01-20 RX ADMIN — SODIUM CHLORIDE 1000 ML: 9 INJECTION, SOLUTION INTRAVENOUS at 20:17

## 2018-01-20 RX ADMIN — PANTOPRAZOLE SODIUM 40 MG: 40 INJECTION, POWDER, FOR SOLUTION INTRAVENOUS at 08:39

## 2018-01-20 ASSESSMENT — ACTIVITIES OF DAILY LIVING (ADL)
COGNITION: 0 - NO COGNITION ISSUES REPORTED
RETIRED_COMMUNICATION: 0-->UNDERSTANDS/COMMUNICATES WITHOUT DIFFICULTY
BATHING: 0-->INDEPENDENT
TOILETING: 0-->INDEPENDENT
FALL_HISTORY_WITHIN_LAST_SIX_MONTHS: NO
RETIRED_EATING: 0-->INDEPENDENT
DRESS: 0-->INDEPENDENT
TRANSFERRING: 0-->INDEPENDENT
AMBULATION: 0-->INDEPENDENT
SWALLOWING: 0-->SWALLOWS FOODS/LIQUIDS WITHOUT DIFFICULTY

## 2018-01-20 ASSESSMENT — ENCOUNTER SYMPTOMS
SHORTNESS OF BREATH: 0
NAUSEA: 1
DIARRHEA: 1
FEVER: 0
COUGH: 0
ABDOMINAL PAIN: 1
VOMITING: 1

## 2018-01-20 NOTE — ED NOTES
Pt. States that he has been vomiting since around 2000 tonight and has had 4 diarrhea episodes since 2300.  Pt. C/o epigastric burning like an ulcer.  Pt. States hx of colitis.  Pt. States he hasn't had pain in his stomache like this for a long time.  Pt. Is A&OX4, pink, warm, dry.  Pt. Has even and unlabored respirations.  Pt. States symptoms started shortly after eating dinner.  Pt. Provided stool sample and is at bedside.

## 2018-01-20 NOTE — ED PROVIDER NOTES
Sign out Provider: Jesús  Sign out Plan: 27-year-old male with a history of ulcerative colitis who presents with nausea, vomiting and diarrhea.  CT scan of the abdomen shows mild colitis.  Patient does have significant leukocytosis and is continued to have vomiting.  Awaiting admission to the observation unit for continued symptom control.    Reassessment: Patient is persistently tachycardic despite 2 L of normal saline into the 130s.  Repeat temp is 99.  ECG shows sinus tachycardia. He does report generalized myalgias as well and flu swab was sent.  On review of the CT he does have mild colitis in his abdomen is quite tender.  He was started on Cipro Flagyl for possible intra-abdominal infection given his increasing leukocytosis and increasing heart rate.  C. difficile is negative.  He does report similar symptoms in the past when he had a peptic ulcer which he reports required surgical intervention and patching.  In addition he does note bright red blood per rectum but denies any hematemesis or hematochezia.  He was given protonix in the ED. He denies any alcohol use and reports that he quit drinking in 2007.  He  reports he was last on prednisone 2 months ago.  Lactate is 3.5 and given 2L LR.  He will likely require further evaluation by GI with possible endoscopy and will be admitted to medicine for further management.           EKG Interpretation:      Interpreted by Christy Candelario  Time reviewed:0800  Symptoms at time of EKG: None   Rhythm: Normal sinus  and Sinus tachycardia  Rate: Tachycardia  Axis: Right Axis Deviation  Ectopy: None  Conduction: Normal  ST Segments/ T Waves: No acute ischemic changes  Q Waves: None  Comparison to prior: No old EKG available    Clinical Impression: sinus tachycardia      ADDENDUM: Enteric stool pathogens is positive for norovirus.      Disposition: Medicine admission            Christy Candelario MD  01/20/18 0812            Christy Candelario MD  01/20/18  [id number]

## 2018-01-20 NOTE — PHARMACY-ADMISSION MEDICATION HISTORY
Admission medication history interview status for the 1/19/2018 admission is complete. See Epic admission navigator for allergy information, pharmacy, prior to admission medications and immunization status.     Medication history interview sources:  Patient, prescription bottle from [facility] ([place]; [phone])    Changes made to PTA medication list (reason)  Added: Canasa  Deleted: ferrous sulfate, Norco  Changed: Lialda (clarified directions with photo of the prescription bottle)    Additional medication history information (including reliability of information, actions taken by pharmacist): The patient was a reliable historian and able to discuss his medications without difficulty. He reported he stopped taking the ferrous sulfate because he was not having any issues with his ulcerative colitis.       Prior to Admission medications    Medication Sig Last Dose Taking? Auth Provider   mesalamine (LIALDA) 1.2 G EC tablet Take 2,400 mg by mouth daily (with breakfast)  Yes Unknown, Entered By History   mesalamine (CANASA) 1000 MG Suppository Place 1,000 mg rectally nightly as needed   Yes Unknown, Entered By History         Medication history completed by:  Dhara Shultz, PharmD, BCPP  Behavioral ER Pharmacist  [phone]

## 2018-01-20 NOTE — ED PROVIDER NOTES
"    [facility] EMERGENCY DEPARTMENT ([facility])    1/19/18     ED 9    History     Chief Complaint   Patient presents with     Nausea, Vomiting, & Diarrhea     N/V/D started 2 hrs ago. Hx ulcerative colitis. Hx ulcer. Thought he noted dark/black stool.      The history is provided by the patient and medical records.     Cresencio Del Rosario is a 27 year old male who presents to the Emergency Department complaining of nausea, vomiting, and diarrhea that started this evening.  He states he has vomited greater than 10 times, and had between 5 and 10 episodes of diarrhea.  He also complains of severe burning epigastric abdominal pain.  He has had an ulcer in the past and wonders if he may have this again.  He reports he has a history of, \"chronic colitis.\"  He states he follows with GI, but cannot tell me where.  [facility] review [facility] , which notes that he does have a history of ulcerative colitis, has been on Asacol.  The patient states that he thought he may have had some dark stool, although states he was able to give a stool sample here, and it does not look dark or bloody.  There is no blood in the emesis.  He states he does not think he had a fever, though at times has felt hot.  He denies any recent travel, recent antibiotic use.  No cough or shortness of breath.    This part of the document was transcribed by Madonna Martell Medical Scribe.      I have reviewed the Medications, Allergies, Past Medical and Surgical History, and Social History in the [employer] system.  PAST MEDICAL HISTORY:   Past Medical History:   Diagnosis Date     Colitis      Ulcer of upper gastrointestinal tract        PAST SURGICAL HISTORY:   Past Surgical History:   Procedure Laterality Date     COLONOSCOPY       SHOULDER SURGERY  2009       FAMILY HISTORY: No family history on file.    SOCIAL HISTORY:   Social History   Substance Use Topics     Smoking status: Former Smoker     Smokeless tobacco: Never Used     " Alcohol use No       Patient's Medications   New Prescriptions    No medications on file   Previous Medications    FERROUS SULFATE (IRON) 325 (65 FE) MG TABLET    Take 325 mg by mouth daily (with breakfast)    HYDROCODONE-ACETAMINOPHEN (NORCO) 5-325 MG PER TABLET    Take 1-2 tablets by mouth every 4 hours as needed for moderate to severe pain    MESALAMINE PO    Take 1.2 g by mouth 2 times daily   Modified Medications    No medications on file   Discontinued Medications    No medications on file        No Known Allergies     Review of Systems   Constitutional: Negative for fever.   Respiratory: Negative for cough and shortness of breath.    Gastrointestinal: Positive for abdominal pain, diarrhea, nausea and vomiting. Blood in stool: ?   All other systems reviewed and are negative.      Physical Exam   BP: 124/88  Pulse: 125  Temp: 99.8  F (37.7  C)  Resp: 18  SpO2: 96 %      Physical Exam   Constitutional: He appears distressed (Frequent vomiting/eating).   HENT:   Head: Atraumatic.   Mouth/Throat: No oropharyngeal exudate.   Dry mucous membranes   Eyes: Pupils are equal, round, and reactive to light. No scleral icterus.   Cardiovascular: Normal heart sounds and intact distal pulses.    Tachycardia   Pulmonary/Chest: Breath sounds normal. No respiratory distress.   Abdominal: Soft. Bowel sounds are normal. There is tenderness (Mild to moderate epigastric tenderness with voluntary guarding).   Musculoskeletal: He exhibits no edema or tenderness.   Skin: Skin is warm. No rash noted. He is not diaphoretic.       ED Course     ED Course     Procedures             Critical Care time:  none             Labs Ordered and Resulted from Time of ED Arrival Up to the Time of Departure from the ED   CBC WITH PLATELETS DIFFERENTIAL - Abnormal; Notable for the following:        Result Value    WBC 21.6 (*)     RDW 16.8 (*)     Absolute Neutrophil 17.9 (*)     Absolute Monocytes 1.5 (*)     All other components within normal limits    COMPREHENSIVE METABOLIC PANEL - Abnormal; Notable for the following:     Glucose 134 (*)     All other components within normal limits   LIPASE   CBC WITH PLATELETS DIFFERENTIAL   COMPREHENSIVE METABOLIC PANEL   MAY SALINE LOCK IV   VITAL SIGNS   ENTERIC BACTERIA AND VIRUS PANEL BY AKIKO STOOL   CLOSTRIDIUM DIFFICILE TOXIN B            Assessments & Plan (with Medical Decision Making)   The patient was given Zofran, famotidine, GI cocktail, without much improvement in his nausea, no improvement of his pain.  He was given 1 small dose of Dilaudid.  He had ongoing nausea, was given 5 mg of Zyprexa, with good relief.  White blood cell count was quite elevated at 21.6.  Basic labs including LFTs and lipase were otherwise unremarkable.  Because of tachycardia, his history of ulcerative colitis, as well as the significant leukocytosis, I did opt to CT his abdomen.  This showed probable mild colitis with no bowel obstruction.  There were also a few borderline and mildly enlarged mesenteric lymph nodes which were similar to previous exam.  It is unclear to me whether the CT findings are related to his current acute illness, or whether the mild colitis may be related to his underlying chronic disease.    I do suspect that the significant leukocytosis may very well be due to his repeated forceful vomiting.  There is no clear evidence for serious bacterial infection at this time.  He does not have a surgical abdomen.  He does remain mildly tachycardic despite fluids. Given this, I will admit him for ongoing fluid hydration and symptom control.  He should have a GI consult today given his underlying ulcerative colitis.  Stool studies were sent, come back negative for C. difficile, though enteric virus and bacteria panel are still pending.  The patient was agreeable to admission to the observation unit, and he will transfer there when a bed becomes available.    Dictation Disclaimer: Some of this Note has been completed with  voice-recognition dictation software. Although errors are generally corrected real-time, there is the potential for a rare error to be present in the completed chart.      I have reviewed the nursing notes.    I have reviewed the findings, diagnosis, plan and need for follow up with the patient.    New Prescriptions    No medications on file       Final diagnoses:   Nausea and vomiting, intractability of vomiting not specified, unspecified vomiting type   Diarrhea, unspecified type   Dehydration   Ulcerative colitis without complications, unspecified location (H)       1/19/2018   [facility], [place], EMERGENCY DEPARTMENT     Lakia Peacock MD  01/20/18 0631

## 2018-01-20 NOTE — H&P
History and Physical    Internal Medicine      Date of Service: 18  Date of Admission: 2018  Patient Name: Cresencio Del Rosario  : 1990  MRN: [id number]     Chief complaint:   Nausea, vomiting, diarrhea, epigastric pain      History of Present Illness:   Cresencio Del Rosario is a 27 year old male with PMH of ulcerative colitis on mesalamine and peptic ulcer disease who presents with 1 day history of nausea, vomiting, diarrhea, and epigastric pain.     Patient reports last evening he developed fairly sudden onset nausea, vomiting, diarrhea, and epigastric pain. He had copious amount of vomitng and loose stools and so presented to [facility] ED. He said in total he has had at least 9-10 episodes of vomiting and loose stools each. He has a history of 'ulcers' and so thought his gastric pain may be due to an ulcer.  He also had associated fevers, chills, and diaphoresis. He reports his loose stools initially were watery but after awhile became 'blood tinged' which he says sometimes occurs with his history of colitis. He denies any dark black, radha bloody, or maroon stools. He denies any skin changes or rashes. He reports his cousin had similar symptoms to him, he denies any other sick contacts, medication changes, or recent travel.     He reports his last UC flare was about 2 months ago. He says his current symptoms are distinct from his previous UC flares that typically present with lower quadrant cramping and loose stools, without emesis.    In the ED the patient was found to have Tmax 100.2 F, non-hypotensive, but tachycardic to 140s, and slightly tachypneic with RR low 20s on room air. Labs were significant for WBC 21.6, CO2 17, normal Cr and LFTs, with a lactate 3.5, CT abdomen/pelvis that was consistent with mild colitis, and enteric panel positive for norovirus. Blood cultures were drawn, and the patient was treated with at least 6L of IVF, 40 mg IV pantoprazole, and given one dose of cipro/flagyl  and transferred to [facility] for further management.      Review of Symptoms:     Comprehensive 10 point review of systems was negative unless otherwise noted in the HPI.     Past Medical History:     Past Medical History:   Diagnosis Date     Colitis      Ulcer of upper gastrointestinal tract        Past Surgical History:   Procedure Laterality Date     COLONOSCOPY       SHOULDER SURGERY          Allergies:     Allergies   Allergen Reactions     Indomethacin Other (See Comments)     Possibly indomethacin - it was a medication for gout (not allopurinol or colchicine) and developed an esophageal ulcer        Outpatient Medications:     Mesalamine 1.2 g ec tablet     Family History:   Denies family hx of ulcerative colitis, crohn's disease     Social History:     Tobacco Use: Former smoker, quit in    Illicit Drug Use: Endorses prior marijuana use quit in    Alcohol Use: Denies  Occupation: Student     Physical Exam:   Blood pressure (!) 137/99, pulse 130, temperature 102  F (38.9  C), resp. rate 18, SpO2 96 %.  Temp (24hrs), Av.5  F (38.1  C), Min:99.8  F (37.7  C), Max:102  F (38.9  C)    Gen: no acute distress, diaphoretic   HEENT: no scleral icterus, pupils equal and reactive to light, dry mucous membranes, no nasal discharge.  NECK: no adenopathy, no asymmetry, masses, or scars, thyroid normal to palpation and no bruits, JVP not elevated  CARDIOVASCULAR: tachycardic rate, regular rhythm. S1/S2 normal, no murmurs, rubs or gallops   RESPIRATORY: clear to auscultation bilaterally, no rales, rhonchi or wheezes, no use of accessory muscles, no retractions, respirations unlabored   ABDOMEN: soft, tenderness at epigastrium,  without rebound or guarding, no hepatosplenomegaly, no palpable masses, bowel sounds present  EXTREMITIES: peripheral pulses normal, no peripheral edema, warm, capillary refill < 2 seconds  Skin: no ecchymoses, no rashes  NEURO: oriented to person, place, year, and situation;  CN II-XII grossly intact; 5/5 strength throughout  PSYCH: affect appropriate      Data:     CMP  Recent Labs  Lab 01/20/18  0642 01/20/18  0022   * 142   POTASSIUM 4.4 4.0   CHLORIDE 117* 109   CO2 17* 21   ANIONGAP 11 12   * 134*   BUN 19 21   CR 0.88 0.96   GFRESTIMATED >90 >90   GFRESTBLACK >90 >90   GEORGE 7.8* 9.2   MAG  --  1.8   PROTTOTAL 6.6* 7.9   ALBUMIN 3.3* 4.2   BILITOTAL 0.6 0.4   ALKPHOS 95 123   AST 21 32   ALT 49 62     CBC  Recent Labs  Lab 01/20/18  0642 01/20/18  0022   WBC 22.3* 21.6*   RBC 5.57 5.58   HGB 15.5 15.1   HCT 46.6 45.6   MCV 84 82   MCH 27.8 27.1   MCHC 33.3 33.1   RDW 16.5* 16.8*    420     INR    Recent Labs  Lab 01/20/18  1820   INR 1.44*      EKG:    Sinus tachycardia    MICRO:     Influenza A/B negative  Enteric bacteria and virus panel: positive for norovirus   C diff PCR: negative      Imaging:    CT abdomen and pelvis with contrast  FINDINGS:  Abdomen: The lung bases are unremarkable. The heart size is normal.  There is diffuse fatty infiltration of the liver. The gallbladder is  distended but otherwise appears normal. The spleen, pancreas, adrenal  glands and kidneys are normal in appearance. There are a few  borderline and mildly enlarged mesenteric lymph nodes which are  similar to the previous exam.    Pelvis: Small and large bowel are fluid filled but nondilated. There  is mild wall thickening of the colon without surrounding inflammation.  No free intraperitoneal gas or fluid. No focal fluid collection to  suggest abscess. Multiple pelvic phleboliths.  IMPRESSION:  1. Probable mild colitis. No bowel obstruction.  2. A few borderline and mildly enlarged mesenteric lymph nodes are  similar to the previous exam.  3. Fatty infiltration of the liver.     Assessment/Plan:   Cresencio Del Rosario is a 27 year old male with PMH of ulcerative colitis on mesalamine and peptic ulcer disease who presents with 1 day history of norovirus gastroenteritis.    # Norovirus  gastroenteritis  # Ulcerative colitis  Patient presented with acute nausea, vomiting, diarrhea, fever, leukocytosis, lactic acidosis (resolved with fluids) with enteric panel positive for norovirus.  Low suspicion for UC flare given symptoms are atypical for his usual UC flares (typically present without emesis and with lower quadrant abdominal pain), however does have elevated CRP (with normal ESR) on admission with CT abdomen showing 'mild colitis'. Patient received one dose of ciprofloxacin/flagyl in the ED, however lower suspicion for bacterial infection given no bacterial source identified. Suspect ongoing tachycardia is due to incomplete fluid resuscitation in the setting of significant volume loss. Given patient on immunusuppressant, will not empirically treat for an unidentified source of infection given patient at risk for developing c diff.   - continue fluid resuscitation with IV fluid resucitation  - no indication for antibiotics at this time, if patient has persistent fevers or tachycardia despite continued volume resuscitaton, may reconsider  - anti-emetics  - clear liquid diet tonight, advance as tolerated in the AM   - cont PTA mesalamine  - trend CRP   - if supportive cares do not lead to improvement in symptoms, consider pt having UC flare and consult GI     # Hypokalemia  # Hypomagnesemia  Likely in setting of emesis and diarrhea  - electrolyte replacement protocol  - continue to monitor    # Elevated INR  On admission INR of 1.44. Unclear etiology of elevated INR, could be secondary to malabsorptive process given nausea/vomiting.  - will recheck    Fluids: bolus prn   Electrolytes: K and Mag replacement protocol  Nutrition: clear liquid diet  DVT ppx: mechanical   Stress Ulcer ppx: none  Glycemic Control: n/a  Consults: none   Code Status: full code  Discharge plan: pending stabilization of symptoms    Patient seen and discussed with Dr. Chinchilla who agrees with the plan detailed above. Please  see attending addendum for changes to plan.     Krista Gaffney  Internal Medicine PGY1  Pager: [phone]

## 2018-01-20 NOTE — SUMMARY OF CARE
Pt came here around 5:50pm at 5B. His belongs are bag with notebooks, iPad, , cell phone, , keys, sweater, shirt, short, pant, shoes, jacket, cap, and gloves.

## 2018-01-20 NOTE — ED NOTES
ALEXANDREA [facility] updated on patient's transfer/current condition and verbalized understanding.

## 2018-01-21 LAB
ANION GAP SERPL CALCULATED.3IONS-SCNC: 8 MMOL/L (ref 3–14)
BACTERIA SPEC CULT: NO GROWTH
BUN SERPL-MCNC: 10 MG/DL (ref 7–30)
CALCIUM SERPL-MCNC: 7.4 MG/DL (ref 8.5–10.1)
CHLORIDE SERPL-SCNC: 116 MMOL/L (ref 94–109)
CO2 SERPL-SCNC: 19 MMOL/L (ref 20–32)
CREAT SERPL-MCNC: 0.92 MG/DL (ref 0.66–1.25)
CRP SERPL-MCNC: 160 MG/L (ref 0–8)
ERYTHROCYTE [DISTWIDTH] IN BLOOD BY AUTOMATED COUNT: 17.8 % (ref 10–15)
GFR SERPL CREATININE-BSD FRML MDRD: >90 ML/MIN/1.7M2
GLUCOSE SERPL-MCNC: 110 MG/DL (ref 70–99)
HCT VFR BLD AUTO: 38.8 % (ref 40–53)
HGB BLD-MCNC: 12.3 G/DL (ref 13.3–17.7)
INR PPP: 1.5 (ref 0.86–1.14)
LACTATE BLD-SCNC: 1.5 MMOL/L (ref 0.7–2)
Lab: NORMAL
MAGNESIUM SERPL-MCNC: 2 MG/DL (ref 1.6–2.3)
MAGNESIUM SERPL-MCNC: 2.2 MG/DL (ref 1.6–2.3)
MAGNESIUM SERPL-MCNC: 2.7 MG/DL (ref 1.6–2.3)
MCH RBC QN AUTO: 26.9 PG (ref 26.5–33)
MCHC RBC AUTO-ENTMCNC: 31.7 G/DL (ref 31.5–36.5)
MCV RBC AUTO: 85 FL (ref 78–100)
PLATELET # BLD AUTO: 233 10E9/L (ref 150–450)
POTASSIUM SERPL-SCNC: 3.8 MMOL/L (ref 3.4–5.3)
POTASSIUM SERPL-SCNC: 3.8 MMOL/L (ref 3.4–5.3)
RBC # BLD AUTO: 4.57 10E12/L (ref 4.4–5.9)
SODIUM SERPL-SCNC: 143 MMOL/L (ref 133–144)
SPECIMEN SOURCE: NORMAL
WBC # BLD AUTO: 12.7 10E9/L (ref 4–11)

## 2018-01-21 PROCEDURE — 36415 COLL VENOUS BLD VENIPUNCTURE: CPT | Performed by: STUDENT IN AN ORGANIZED HEALTH CARE EDUCATION/TRAINING PROGRAM

## 2018-01-21 PROCEDURE — 25000125 ZZHC RX 250: Performed by: INTERNAL MEDICINE

## 2018-01-21 PROCEDURE — 25000128 H RX IP 250 OP 636: Performed by: STUDENT IN AN ORGANIZED HEALTH CARE EDUCATION/TRAINING PROGRAM

## 2018-01-21 PROCEDURE — 25000125 ZZHC RX 250: Performed by: STUDENT IN AN ORGANIZED HEALTH CARE EDUCATION/TRAINING PROGRAM

## 2018-01-21 PROCEDURE — 85049 AUTOMATED PLATELET COUNT: CPT | Performed by: INTERNAL MEDICINE

## 2018-01-21 PROCEDURE — 25000132 ZZH RX MED GY IP 250 OP 250 PS 637: Performed by: STUDENT IN AN ORGANIZED HEALTH CARE EDUCATION/TRAINING PROGRAM

## 2018-01-21 PROCEDURE — 85610 PROTHROMBIN TIME: CPT | Performed by: STUDENT IN AN ORGANIZED HEALTH CARE EDUCATION/TRAINING PROGRAM

## 2018-01-21 PROCEDURE — 83735 ASSAY OF MAGNESIUM: CPT | Performed by: STUDENT IN AN ORGANIZED HEALTH CARE EDUCATION/TRAINING PROGRAM

## 2018-01-21 PROCEDURE — 36415 COLL VENOUS BLD VENIPUNCTURE: CPT | Performed by: INTERNAL MEDICINE

## 2018-01-21 PROCEDURE — 83735 ASSAY OF MAGNESIUM: CPT | Performed by: INTERNAL MEDICINE

## 2018-01-21 PROCEDURE — 99233 SBSQ HOSP IP/OBS HIGH 50: CPT | Mod: GC | Performed by: INTERNAL MEDICINE

## 2018-01-21 PROCEDURE — 80048 BASIC METABOLIC PNL TOTAL CA: CPT | Performed by: STUDENT IN AN ORGANIZED HEALTH CARE EDUCATION/TRAINING PROGRAM

## 2018-01-21 PROCEDURE — 12000001 ZZH R&B MED SURG/OB UMMC

## 2018-01-21 PROCEDURE — 86140 C-REACTIVE PROTEIN: CPT | Performed by: STUDENT IN AN ORGANIZED HEALTH CARE EDUCATION/TRAINING PROGRAM

## 2018-01-21 PROCEDURE — 85027 COMPLETE CBC AUTOMATED: CPT | Performed by: STUDENT IN AN ORGANIZED HEALTH CARE EDUCATION/TRAINING PROGRAM

## 2018-01-21 PROCEDURE — 84132 ASSAY OF SERUM POTASSIUM: CPT | Performed by: INTERNAL MEDICINE

## 2018-01-21 PROCEDURE — 83605 ASSAY OF LACTIC ACID: CPT | Performed by: STUDENT IN AN ORGANIZED HEALTH CARE EDUCATION/TRAINING PROGRAM

## 2018-01-21 PROCEDURE — 87040 BLOOD CULTURE FOR BACTERIA: CPT | Performed by: INTERNAL MEDICINE

## 2018-01-21 PROCEDURE — 25000128 H RX IP 250 OP 636: Performed by: INTERNAL MEDICINE

## 2018-01-21 RX ORDER — ACETAMINOPHEN 325 MG/1
325 TABLET ORAL EVERY 4 HOURS PRN
Status: DISCONTINUED | OUTPATIENT
Start: 2018-01-21 | End: 2018-01-21 | Stop reason: DRUGHIGH

## 2018-01-21 RX ORDER — ACETAMINOPHEN 325 MG/1
975 TABLET ORAL EVERY 6 HOURS PRN
Status: DISCONTINUED | OUTPATIENT
Start: 2018-01-21 | End: 2018-01-23 | Stop reason: HOSPADM

## 2018-01-21 RX ORDER — SODIUM CHLORIDE, SODIUM LACTATE, POTASSIUM CHLORIDE, CALCIUM CHLORIDE 600; 310; 30; 20 MG/100ML; MG/100ML; MG/100ML; MG/100ML
INJECTION, SOLUTION INTRAVENOUS CONTINUOUS
Status: DISCONTINUED | OUTPATIENT
Start: 2018-01-21 | End: 2018-01-23

## 2018-01-21 RX ADMIN — SODIUM CHLORIDE 1000 ML: 9 INJECTION, SOLUTION INTRAVENOUS at 00:34

## 2018-01-21 RX ADMIN — MESALAMINE 2400 MG: 1.2 TABLET, DELAYED RELEASE ORAL at 10:39

## 2018-01-21 RX ADMIN — Medication 10 MEQ: at 02:58

## 2018-01-21 RX ADMIN — POTASSIUM CHLORIDE 20 MEQ: 750 TABLET, EXTENDED RELEASE ORAL at 16:05

## 2018-01-21 RX ADMIN — POTASSIUM CHLORIDE 20 MEQ: 750 TABLET, EXTENDED RELEASE ORAL at 10:39

## 2018-01-21 RX ADMIN — ACETAMINOPHEN 975 MG: 325 TABLET, FILM COATED ORAL at 18:49

## 2018-01-21 RX ADMIN — Medication 10 MEQ: at 01:54

## 2018-01-21 RX ADMIN — Medication 10 MEQ: at 00:47

## 2018-01-21 RX ADMIN — SODIUM CHLORIDE, POTASSIUM CHLORIDE, SODIUM LACTATE AND CALCIUM CHLORIDE: 600; 310; 30; 20 INJECTION, SOLUTION INTRAVENOUS at 18:50

## 2018-01-21 RX ADMIN — SODIUM CHLORIDE 1000 ML: 9 INJECTION, SOLUTION INTRAVENOUS at 01:54

## 2018-01-21 RX ADMIN — ENOXAPARIN SODIUM 40 MG: 40 INJECTION SUBCUTANEOUS at 11:40

## 2018-01-21 RX ADMIN — Medication 10 MEQ: at 04:02

## 2018-01-21 RX ADMIN — PANTOPRAZOLE SODIUM 40 MG: 40 INJECTION, POWDER, FOR SOLUTION INTRAVENOUS at 11:40

## 2018-01-21 RX ADMIN — PHYTONADIONE 2 MG: 10 INJECTION, EMULSION INTRAMUSCULAR; INTRAVENOUS; SUBCUTANEOUS at 12:49

## 2018-01-21 RX ADMIN — SODIUM CHLORIDE, POTASSIUM CHLORIDE, SODIUM LACTATE AND CALCIUM CHLORIDE: 600; 310; 30; 20 INJECTION, SOLUTION INTRAVENOUS at 11:40

## 2018-01-21 RX ADMIN — SODIUM CHLORIDE, POTASSIUM CHLORIDE, SODIUM LACTATE AND CALCIUM CHLORIDE 1000 ML: 600; 310; 30; 20 INJECTION, SOLUTION INTRAVENOUS at 03:59

## 2018-01-21 RX ADMIN — ACETAMINOPHEN 325 MG: 325 TABLET, FILM COATED ORAL at 01:54

## 2018-01-21 RX ADMIN — Medication 2 G: at 10:39

## 2018-01-21 RX ADMIN — ACETAMINOPHEN 975 MG: 325 TABLET, FILM COATED ORAL at 10:39

## 2018-01-21 RX ADMIN — SODIUM CHLORIDE, POTASSIUM CHLORIDE, SODIUM LACTATE AND CALCIUM CHLORIDE 2000 ML: 600; 310; 30; 20 INJECTION, SOLUTION INTRAVENOUS at 05:52

## 2018-01-21 NOTE — PLAN OF CARE
Problem: Patient Care Overview  Goal: Plan of Care/Patient Progress Review  D: Temperature 100.4 & 99.7,  Heart rate 100-118, other vitals stable.  Alert and oriented.  Tolerating clear liquid diet.  Complains of body aches especially in his neck.  Tylenol, heat, and ice for treatment of body aches.  INR=1.5, received vitamin K.  WBC=12.7.  K=3.8 & Magnesium =2.0, both replaced.  Denies nausea today.  Up to bathroom independently.  States he is feeling better.  IV bolus given as ordered.  Trace of generalized edema.  P: Medicate as needed for pain.  Monitor labs and vitals.  Continue with plan of care.

## 2018-01-21 NOTE — PROGRESS NOTES
Pt arrived around 5:50 via EMT stretcher. Belongings with patient. Assessment complete. Pt has pain in upper abdomen.  Bowel sounds present. Lungs clear. Slight temp. Tachy in 110-120. Oriented pt to room. Call light within reach. Denies any questions at this time.

## 2018-01-21 NOTE — PLAN OF CARE
Problem: Patient Care Overview  Goal: Plan of Care/Patient Progress Review  Pt A&O x4. Temp 100.4. -130's. Pt reports vomiting this morning with nausea on and off through out day. Mild pain in upper abdomen. Denied pain meds. No numbness or tingling. Pt is diaphoretic. 1L of normal saline bolus given. Mg 1.4. K+ 3.3. WBC 13.9. Mg running at 100ml/120min. Pt up SBA. Tolerating clear liquids well. Pt has loose stools BM x2. Will continue to monitor.

## 2018-01-21 NOTE — PLAN OF CARE
Problem: Patient Care Overview  Goal: Plan of Care/Patient Progress Review  Outcome: No Change  Pt A&Ox4, febrile and tachycardic. Enteric Precautions. Replaced K+ overnight because it was 3.3. Recheck this AM. Mg also replaced because it was 1.4. Recheck was 2.7. Gave x2 NS fluid bolus but pt still remained tachycardic. Gave x1 LR fluid bolus and started 2000mL LR fluid bolus that will run over 4 hours. L hand PIV infiltrated. Still has R PIV. x2 loose stools. Around 0630 pt called because he was unable to make it to the bathroom. Stool looked very odd. It was mucousy, green and had some red in it. Will pass this along to next nurse. Will continue to monitor and follow POC.

## 2018-01-21 NOTE — PROGRESS NOTES
"[facility] Internal Medicine Progress Note          Assessment and Plan:   Assessment:  Cresencio Del Rosario is a 27 year old male with ulcerative colitis on mesalamine and peptic ulcer disease presenting with norovirus gastroenteritis.     Plan:  # Norovirus gastroenteritis  # Ulcerative colitis  # Hypokalemia  # Hypomagnesemia  History and course of illness most consistent with norovirus as sole etiology. If he is not improving, will consider further evaluation/treatment for co-existing UC flare. He denies radha hematochezia and states that this is different than his past UC flares. Continues to have significant diarrhea and volume loss.  -  ml/hr  - monitor electrolytes and replete  - trend CRP, ESR, CMP, and CBC   - continue home mesalamine (will discuss if need to hold in setting of infection with GI)  - enoxaparin for DVT prophylaxis     # History of GI due to PUD  - PPI IV while inpatient    # Elevated INR  Could be secondary to malabsorptive process given nausea/vomiting.  - vitamin K IV 2mg  - recheck INR in AM     FEN: NPO,  ml/hr  Proph: enoxaparin SQ, PPI IV  Dispo: Will discharge to home in 2-3 days, pending clinical course    Code: FULL    I have discussed this patient and plan with Dr. Walton.    Mahin Camarena, PGY-3  Internal Medicine  [phone]        Interval History:   Notes reviewed. Having frequent stools with an episode of incontinence.    Epigastric abdominal pain is improved. Still having multiple BMs (4-5 last night). Has blood tinge to stool output, but no hematochezia. Still having mostly liquid stool.        Review Of Systems   Negative except as stated above.          Medications:   Reviewed. Details in EPIC.     Blood pressure 108/59, pulse 103, temperature 100.4  F (38  C), temperature source Oral, resp. rate 16, height 1.676 m (5' 6\"), weight 87.5 kg (192 lb 14.4 oz), SpO2 95 %.    General: Lying in bed, ill appearing, but in no acute distress  CV: tachy, regular, no " m/g/r  Chest: clear bilaterally  Abdomen: Soft, tender in epigastrum with mild guarding, nondistended; BS+  Extremities: No LE edema  Neuro:  Alert, face symmetric, moving all extremities without focal deficit         Data:   Reviewed. Details in EPIC.

## 2018-01-22 LAB
ALBUMIN SERPL-MCNC: 2.6 G/DL (ref 3.4–5)
ALP SERPL-CCNC: 56 U/L (ref 40–150)
ALT SERPL W P-5'-P-CCNC: 40 U/L (ref 0–70)
ANION GAP SERPL CALCULATED.3IONS-SCNC: 8 MMOL/L (ref 3–14)
AST SERPL W P-5'-P-CCNC: 61 U/L (ref 0–45)
BILIRUB SERPL-MCNC: 0.5 MG/DL (ref 0.2–1.3)
BUN SERPL-MCNC: 5 MG/DL (ref 7–30)
CALCIUM SERPL-MCNC: 7.9 MG/DL (ref 8.5–10.1)
CHLORIDE SERPL-SCNC: 112 MMOL/L (ref 94–109)
CO2 SERPL-SCNC: 22 MMOL/L (ref 20–32)
CREAT SERPL-MCNC: 0.72 MG/DL (ref 0.66–1.25)
CRP SERPL-MCNC: 140 MG/L (ref 0–8)
ERYTHROCYTE [DISTWIDTH] IN BLOOD BY AUTOMATED COUNT: 17.3 % (ref 10–15)
ERYTHROCYTE [SEDIMENTATION RATE] IN BLOOD BY WESTERGREN METHOD: 10 MM/H (ref 0–15)
GFR SERPL CREATININE-BSD FRML MDRD: >90 ML/MIN/1.7M2
GLUCOSE SERPL-MCNC: 70 MG/DL (ref 70–99)
HCT VFR BLD AUTO: 38.2 % (ref 40–53)
HGB BLD-MCNC: 12.1 G/DL (ref 13.3–17.7)
INR PPP: 1.17 (ref 0.86–1.14)
INTERPRETATION ECG - MUSE: NORMAL
MAGNESIUM SERPL-MCNC: 1.7 MG/DL (ref 1.6–2.3)
MAGNESIUM SERPL-MCNC: 2 MG/DL (ref 1.6–2.3)
MCH RBC QN AUTO: 26.9 PG (ref 26.5–33)
MCHC RBC AUTO-ENTMCNC: 31.7 G/DL (ref 31.5–36.5)
MCV RBC AUTO: 85 FL (ref 78–100)
PLATELET # BLD AUTO: 261 10E9/L (ref 150–450)
POTASSIUM SERPL-SCNC: 3.6 MMOL/L (ref 3.4–5.3)
POTASSIUM SERPL-SCNC: 3.8 MMOL/L (ref 3.4–5.3)
PROT SERPL-MCNC: 5.5 G/DL (ref 6.8–8.8)
RBC # BLD AUTO: 4.5 10E12/L (ref 4.4–5.9)
SODIUM SERPL-SCNC: 142 MMOL/L (ref 133–144)
WBC # BLD AUTO: 9.7 10E9/L (ref 4–11)

## 2018-01-22 PROCEDURE — 25000132 ZZH RX MED GY IP 250 OP 250 PS 637: Performed by: STUDENT IN AN ORGANIZED HEALTH CARE EDUCATION/TRAINING PROGRAM

## 2018-01-22 PROCEDURE — 99233 SBSQ HOSP IP/OBS HIGH 50: CPT | Mod: GC | Performed by: INTERNAL MEDICINE

## 2018-01-22 PROCEDURE — 85027 COMPLETE CBC AUTOMATED: CPT | Performed by: INTERNAL MEDICINE

## 2018-01-22 PROCEDURE — 85610 PROTHROMBIN TIME: CPT | Performed by: INTERNAL MEDICINE

## 2018-01-22 PROCEDURE — 25000128 H RX IP 250 OP 636: Performed by: INTERNAL MEDICINE

## 2018-01-22 PROCEDURE — 85652 RBC SED RATE AUTOMATED: CPT | Performed by: INTERNAL MEDICINE

## 2018-01-22 PROCEDURE — 12000001 ZZH R&B MED SURG/OB UMMC

## 2018-01-22 PROCEDURE — 25000125 ZZHC RX 250: Performed by: INTERNAL MEDICINE

## 2018-01-22 PROCEDURE — 25000128 H RX IP 250 OP 636: Performed by: STUDENT IN AN ORGANIZED HEALTH CARE EDUCATION/TRAINING PROGRAM

## 2018-01-22 PROCEDURE — 84132 ASSAY OF SERUM POTASSIUM: CPT | Performed by: INTERNAL MEDICINE

## 2018-01-22 PROCEDURE — 80053 COMPREHEN METABOLIC PANEL: CPT | Performed by: INTERNAL MEDICINE

## 2018-01-22 PROCEDURE — 83735 ASSAY OF MAGNESIUM: CPT | Performed by: INTERNAL MEDICINE

## 2018-01-22 PROCEDURE — 25000125 ZZHC RX 250: Performed by: STUDENT IN AN ORGANIZED HEALTH CARE EDUCATION/TRAINING PROGRAM

## 2018-01-22 PROCEDURE — 36415 COLL VENOUS BLD VENIPUNCTURE: CPT | Performed by: INTERNAL MEDICINE

## 2018-01-22 PROCEDURE — 86140 C-REACTIVE PROTEIN: CPT | Performed by: INTERNAL MEDICINE

## 2018-01-22 RX ORDER — CALCIUM CARBONATE 500 MG/1
500 TABLET, CHEWABLE ORAL DAILY PRN
Status: DISCONTINUED | OUTPATIENT
Start: 2018-01-22 | End: 2018-01-23 | Stop reason: HOSPADM

## 2018-01-22 RX ADMIN — POTASSIUM CHLORIDE 20 MEQ: 750 TABLET, EXTENDED RELEASE ORAL at 08:10

## 2018-01-22 RX ADMIN — Medication 2 G: at 08:10

## 2018-01-22 RX ADMIN — ONDANSETRON 4 MG: 2 INJECTION INTRAMUSCULAR; INTRAVENOUS at 20:35

## 2018-01-22 RX ADMIN — SODIUM CHLORIDE, POTASSIUM CHLORIDE, SODIUM LACTATE AND CALCIUM CHLORIDE: 600; 310; 30; 20 INJECTION, SOLUTION INTRAVENOUS at 00:01

## 2018-01-22 RX ADMIN — SODIUM CHLORIDE, POTASSIUM CHLORIDE, SODIUM LACTATE AND CALCIUM CHLORIDE: 600; 310; 30; 20 INJECTION, SOLUTION INTRAVENOUS at 04:46

## 2018-01-22 RX ADMIN — SODIUM CHLORIDE, POTASSIUM CHLORIDE, SODIUM LACTATE AND CALCIUM CHLORIDE: 600; 310; 30; 20 INJECTION, SOLUTION INTRAVENOUS at 09:44

## 2018-01-22 RX ADMIN — PANTOPRAZOLE SODIUM 40 MG: 40 INJECTION, POWDER, FOR SOLUTION INTRAVENOUS at 07:57

## 2018-01-22 RX ADMIN — ACETAMINOPHEN 975 MG: 325 TABLET, FILM COATED ORAL at 19:26

## 2018-01-22 RX ADMIN — ENOXAPARIN SODIUM 40 MG: 40 INJECTION SUBCUTANEOUS at 07:56

## 2018-01-22 RX ADMIN — MESALAMINE 2400 MG: 1.2 TABLET, DELAYED RELEASE ORAL at 07:57

## 2018-01-22 RX ADMIN — CALCIUM CARBONATE (ANTACID) CHEW TAB 500 MG 500 MG: 500 CHEW TAB at 19:26

## 2018-01-22 NOTE — PLAN OF CARE
Problem: Patient Care Overview  Goal: Plan of Care/Patient Progress Review  Pt A&O x4. Temp 99.1 HR in 90's. Pt reported feeling better today.K+ 3.8 Given 20 mEq of k+ will recheck labs in the morning . Tolerating clear liquid diet well. Reported BM x2 this evening with loose stools. C/o body aches especially in neck. Mg replaced in morning. Denies nausea or vomiting today. Up independently to bathroom. Trace edema. Given tylenol x1. Will continue to monitor.

## 2018-01-22 NOTE — PROGRESS NOTES
General acute hospital, [place]    Internal Medicine Progress Note - Maroon Service    Main Plans for Today   Start regular diet  Decrease IVF rate    Assessment & Plan   Cresencio Del Rosario is a 27 year old male with ulcerative colitis on mesalamine and peptic ulcer disease presenting with norovirus gastroenteritis.      Plan:  # Norovirus gastroenteritis  # Ulcerative colitis  # Hypokalemia  # Hypomagnesemia  History and course of illness most consistent with norovirus as sole etiology. If he is not improving, will consider further evaluation/treatment for co-existing UC flare, however clinically he is improving. He denies radha hematochezia and states that this is different than his past UC flares. Continues to have significant diarrhea and volume loss, however, the frequency and volume is decreasing.  -  ml/hr-> 100ml/hr  - monitor electrolytes and replete  - trend CRP, ESR, CMP, and CBC   - continue home mesalamine (will discuss if need to hold in setting of infection with GI. Currently infection seems to be improving, so will continue mesalamine)  - enoxaparin for DVT prophylaxis  - Start regular diet. If tolerates, possible dc tomorrow.    # History of GI due to PUD  - PPI IV while inpatient     # Elevated INR  Could be secondary to malabsorptive process given nausea/vomiting.  - vitamin K IV 2mg  - recheck INR 1.17. Will monitor.    Code: FULL  Diet: Regular Diet Adult  Fluids: LR 100ml/hr  DVT Prophylaxis: Enoxaparin (Lovenox) SQ  Code Status: Full Code    Disposition Plan   Expected discharge: Tomorrow, recommended to prior living arrangement once pt tolerates PO intake.     Entered: Aviva Barfield 01/22/2018, 12:35 PM   Information in the above section will display in the discharge planner report.      The patient's care was discussed with the Attending Physician, Dr. Walton.    Aviva Barfield  [facility]  Maroon: 2   Pager: [phone]  Please see sticky note for cross  cover information    Interval History   No acute event overnight. Less frequency and amount of diarrhea. No abd pain, no fever, chills. No nausea, vomiting.    Physical Exam   Vital Signs: Temp: 98.2  F (36.8  C) Temp src: Oral BP: 131/87 Pulse: 86 Heart Rate: 90 Resp: 16 SpO2: 96 % O2 Device: None (Room air)    Weight: 192 lbs 7.39 oz  General: Lying in bed, no acute distress  CV: RRR, no m/g/r  Chest: CTA bilaterally  Abdomen: Soft, no tender, nondistended; BS+  Extremities: No LE edema  Neuro: Alert, face symmetric, moving all extremities without focal deficit    Data     Recent Labs  Lab 01/22/18  0642 01/21/18  1409 01/21/18  0708  01/20/18  1820 01/20/18  0642 01/20/18  0022   WBC 9.7  --  12.7*  --  13.9* 22.3* 21.6*   HGB 12.1*  --  12.3*  --  13.7 15.5 15.1   MCV 85  --  85  --  85 84 82     --  233  --  293 331 420   INR 1.17*  --  1.50*  --  1.44*  --   --      --  143  --  143 145* 142   POTASSIUM 3.6 3.8 3.8  < > 3.3* 4.4 4.0   CHLORIDE 112*  --  116*  --  115* 117* 109   CO2 22  --  19*  --  18* 17* 21   BUN 5*  --  10  --  15 19 21   CR 0.72  --  0.92  --  0.97 0.88 0.96   ANIONGAP 8  --  8  --  10 11 12   GEORGE 7.9*  --  7.4*  --  7.7* 7.8* 9.2   GLC 70  --  110*  --  106* 160* 134*   ALBUMIN 2.6*  --   --   --   --  3.3* 4.2   PROTTOTAL 5.5*  --   --   --   --  6.6* 7.9   BILITOTAL 0.5  --   --   --   --  0.6 0.4   ALKPHOS 56  --   --   --   --  95 123   ALT 40  --   --   --   --  49 62   AST 61*  --   --   --   --  21 32   LIPASE  --   --   --   --   --   --  376   < > = values in this interval not displayed.

## 2018-01-22 NOTE — PLAN OF CARE
Problem: Patient Care Overview  Goal: Plan of Care/Patient Progress Review  Outcome: Improving  D/I/A: Patient A & O X 4, VSS no respiratory distress noted. Patient denies pain , and tolerating  Clear diet. Patient request diet to be advanced and MD advance diet to regular. Patient IVF at 100 cc/hr. Patient report had loose stool and void adequate. Patient potassium level 3.6, Magnesium 1.7, and replaced per protocol next recheck Mag, K+ tomorrow. Continue with POC and update MD with concerns.

## 2018-01-22 NOTE — PLAN OF CARE
Problem: Patient Care Overview  Goal: Plan of Care/Patient Progress Review  D/I: Pt A&O x4. Vitally stable on room air. Potassium of 3.8 was replaced on evening shift and recheck this morning. Tolerating clear liquid diet well. Loose stools x1. Denies nausea. Up independently to bathroom. Calm and cooperative with cares. Slept in between cares.   P: Continue to monitor electrolytes, replace per protocol, and follow plan of care

## 2018-01-23 VITALS
DIASTOLIC BLOOD PRESSURE: 92 MMHG | RESPIRATION RATE: 16 BRPM | SYSTOLIC BLOOD PRESSURE: 120 MMHG | WEIGHT: 193.7 LBS | HEART RATE: 83 BPM | OXYGEN SATURATION: 96 % | HEIGHT: 66 IN | BODY MASS INDEX: 31.13 KG/M2 | TEMPERATURE: 97.3 F

## 2018-01-23 LAB
ALBUMIN SERPL-MCNC: 2.6 G/DL (ref 3.4–5)
ALP SERPL-CCNC: 63 U/L (ref 40–150)
ALT SERPL W P-5'-P-CCNC: 60 U/L (ref 0–70)
ANION GAP SERPL CALCULATED.3IONS-SCNC: 7 MMOL/L (ref 3–14)
AST SERPL W P-5'-P-CCNC: 82 U/L (ref 0–45)
BILIRUB SERPL-MCNC: 0.4 MG/DL (ref 0.2–1.3)
BUN SERPL-MCNC: 9 MG/DL (ref 7–30)
CALCIUM SERPL-MCNC: 8.3 MG/DL (ref 8.5–10.1)
CHLORIDE SERPL-SCNC: 113 MMOL/L (ref 94–109)
CO2 SERPL-SCNC: 21 MMOL/L (ref 20–32)
CREAT SERPL-MCNC: 0.69 MG/DL (ref 0.66–1.25)
CRP SERPL-MCNC: 61 MG/L (ref 0–8)
ERYTHROCYTE [DISTWIDTH] IN BLOOD BY AUTOMATED COUNT: 17.4 % (ref 10–15)
ERYTHROCYTE [SEDIMENTATION RATE] IN BLOOD BY WESTERGREN METHOD: 10 MM/H (ref 0–15)
GFR SERPL CREATININE-BSD FRML MDRD: >90 ML/MIN/1.7M2
GLUCOSE SERPL-MCNC: 78 MG/DL (ref 70–99)
HCT VFR BLD AUTO: 40.1 % (ref 40–53)
HGB BLD-MCNC: 12.8 G/DL (ref 13.3–17.7)
INR PPP: 1.07 (ref 0.86–1.14)
MAGNESIUM SERPL-MCNC: 1.9 MG/DL (ref 1.6–2.3)
MAGNESIUM SERPL-MCNC: 2 MG/DL (ref 1.6–2.3)
MCH RBC QN AUTO: 27.2 PG (ref 26.5–33)
MCHC RBC AUTO-ENTMCNC: 31.9 G/DL (ref 31.5–36.5)
MCV RBC AUTO: 85 FL (ref 78–100)
PLATELET # BLD AUTO: 317 10E9/L (ref 150–450)
POTASSIUM SERPL-SCNC: 3.6 MMOL/L (ref 3.4–5.3)
POTASSIUM SERPL-SCNC: 4 MMOL/L (ref 3.4–5.3)
PROT SERPL-MCNC: 6 G/DL (ref 6.8–8.8)
RBC # BLD AUTO: 4.71 10E12/L (ref 4.4–5.9)
SODIUM SERPL-SCNC: 141 MMOL/L (ref 133–144)
WBC # BLD AUTO: 7.9 10E9/L (ref 4–11)

## 2018-01-23 PROCEDURE — 86140 C-REACTIVE PROTEIN: CPT | Performed by: INTERNAL MEDICINE

## 2018-01-23 PROCEDURE — 80053 COMPREHEN METABOLIC PANEL: CPT | Performed by: INTERNAL MEDICINE

## 2018-01-23 PROCEDURE — 36415 COLL VENOUS BLD VENIPUNCTURE: CPT | Performed by: INTERNAL MEDICINE

## 2018-01-23 PROCEDURE — 25000132 ZZH RX MED GY IP 250 OP 250 PS 637: Performed by: STUDENT IN AN ORGANIZED HEALTH CARE EDUCATION/TRAINING PROGRAM

## 2018-01-23 PROCEDURE — 83735 ASSAY OF MAGNESIUM: CPT | Performed by: INTERNAL MEDICINE

## 2018-01-23 PROCEDURE — 99238 HOSP IP/OBS DSCHRG MGMT 30/<: CPT | Mod: GC | Performed by: PEDIATRICS

## 2018-01-23 PROCEDURE — 84132 ASSAY OF SERUM POTASSIUM: CPT | Performed by: INTERNAL MEDICINE

## 2018-01-23 PROCEDURE — 25000128 H RX IP 250 OP 636: Performed by: STUDENT IN AN ORGANIZED HEALTH CARE EDUCATION/TRAINING PROGRAM

## 2018-01-23 PROCEDURE — 25000128 H RX IP 250 OP 636: Performed by: INTERNAL MEDICINE

## 2018-01-23 PROCEDURE — 85652 RBC SED RATE AUTOMATED: CPT | Performed by: INTERNAL MEDICINE

## 2018-01-23 PROCEDURE — 25000125 ZZHC RX 250: Performed by: INTERNAL MEDICINE

## 2018-01-23 PROCEDURE — 85610 PROTHROMBIN TIME: CPT | Performed by: INTERNAL MEDICINE

## 2018-01-23 PROCEDURE — 85027 COMPLETE CBC AUTOMATED: CPT | Performed by: INTERNAL MEDICINE

## 2018-01-23 RX ADMIN — PANTOPRAZOLE SODIUM 40 MG: 40 INJECTION, POWDER, FOR SOLUTION INTRAVENOUS at 09:32

## 2018-01-23 RX ADMIN — ENOXAPARIN SODIUM 40 MG: 40 INJECTION SUBCUTANEOUS at 09:32

## 2018-01-23 RX ADMIN — SODIUM CHLORIDE, POTASSIUM CHLORIDE, SODIUM LACTATE AND CALCIUM CHLORIDE: 600; 310; 30; 20 INJECTION, SOLUTION INTRAVENOUS at 03:17

## 2018-01-23 RX ADMIN — MESALAMINE 2400 MG: 1.2 TABLET, DELAYED RELEASE ORAL at 09:32

## 2018-01-23 NOTE — DISCHARGE SUMMARY
[facility], [place]    Internal Medicine Discharge Summary- Brittney Service    Date of Admission:  1/19/2018  Date of Discharge:  1/23/2018  3:30 PM  Discharging Attending Provider: Dr. Arias  Discharge Team: Brittney 2    Discharge Diagnoses   Norovirus gastroenteritis  Volume depletion  Hypokalemia  Hypomagnesemia  Elevated INR  Ulcerative colitis without flare    Follow-ups Needed After Discharge   - Take omeprazole daily for next 6-8 weeks  - Follow up with PCP in 1-2 weeks  - Follow up as planned with GI    Hospital Course   Cresencio Del Rosario was admitted on 1/19/2018 for norovirus gastroenteritis and volume depletion.  The following problems were addressed during his hospitalization:    # Norovirus gastroenteritis  # Hypokalemia  # Hypomagnesemia  Cresencio had profuse diarrhea and emesis on admission requiring the administration of 12-15 L of crystalloid over 3 days. His electrolytes were monitored and repleted as needed. On the day of discharge he was tolerating a regular diet and was no longer having any diarrhea. He was advised to wash his hands, as he will likely still be shedding norovirus for the next few weeks.    # Ulcerative colitis  No evidence of flare this admission. CRP elevated, but down trended with improvement in viral gastroenteritis. He resumed his home mesalamine on discharge.    # History of GI due to PUD  Due to stress and NPO status for a few days, he was placed on IV PPI during the admission. He was prescribed PPI on discharge for the next 2 months.      # Elevated INR  Likely secondary to malabsorptive process given diarrhea and vomiting. Improved with vitamin K administration.    Consultations This Hospital Stay   None    Code Status   Full Code     The patient was discussed with Dr. Arias.    Mahin Camarena  [facility]  Pager: [phone]    Physician Attestation   I, Grace Arias, saw and evaluated this patient prior to discharge.  I  discussed the patient with the resident and agree with plan of care as documented in the resident note.      I personally reviewed vital signs, medications, labs and imaging.    I personally spent 25 minutes on discharge activities.    Grace ALMANZARTristan Hugo  Date of Service (when I saw the patient): 1/23/18  ______________________________________________________________________    Physical Exam   Vital Signs: Temp: 97.3  F (36.3  C) Temp src: Oral BP: (!) 120/92   Heart Rate: 85 Resp: 16 SpO2: 96 % O2 Device: None (Room air)    Weight: 193 lbs 11.2 oz    General: Lying in bed, well appearing, pleasant  CV: regular, no m/g/r  Chest: clear bilaterally  Abdomen: Soft, nontender, nondistended; BS+  Extremities: No LE edema  Neuro:  Alert, face symmetric, moving all extremities without focal deficit    Significant Results and Procedures   Results for orders placed or performed during the hospital encounter of 01/19/18   CT Abdomen Pelvis w Contrast    Narrative    CT ABDOMEN PELVIS W CONTRAST  1/20/2018 3:09 AM      HISTORY: UC, abdominal pain, significant leukocytosis - evaluate  abscess.     TECHNIQUE: CT abdomen and pelvis with intravenous contrast. Radiation  dose for this scan was reduced using automated exposure control,  adjustment of the mA and/or kV according to patient size, or iterative  reconstruction technique. 90 mL Isovue-370.      COMPARISON: 3/5/2017.    FINDINGS:  Abdomen: The lung bases are unremarkable. The heart size is normal.  There is diffuse fatty infiltration of the liver. The gallbladder is  distended but otherwise appears normal. The spleen, pancreas, adrenal  glands and kidneys are normal in appearance. There are a few  borderline and mildly enlarged mesenteric lymph nodes which are  similar to the previous exam.    Pelvis: Small and large bowel are fluid filled but nondilated. There  is mild wall thickening of the colon without surrounding inflammation.  No free intraperitoneal gas or fluid. No  focal fluid collection to  suggest abscess. Multiple pelvic phleboliths.      Impression    IMPRESSION:  1. Probable mild colitis. No bowel obstruction.  2. A few borderline and mildly enlarged mesenteric lymph nodes are  similar to the previous exam.  3. Fatty infiltration of the liver.    NILAY ALATORRE MD       Pending Results   These results will be followed up by PCP  Unresulted Labs Ordered in the Past 30 Days of this Admission     Date and Time Order Name Status Description    1/21/2018 1026 Blood culture Preliminary     1/21/2018 1026 Blood culture Preliminary     1/20/2018 0804 Blood culture Preliminary     1/20/2018 0804 Blood culture Preliminary              Primary Care Physician   [facility]    Discharge Disposition   Discharged to home  Condition at discharge: Good    Discharge Orders     Reason for your hospital stay   You were in the hospital due to dehydration related to a norovirus infection.     Follow Up and recommended labs and tests   Follow up with [facility] PCP in 1-2 weeks and as previously planned with [facility]     Activity   Your activity upon discharge: activity as tolerated     Discharge Instructions   Continue your home therapies for ulcerative colitis. You should also take daily omeprazole for the next 6-8 weeks (prescribed on discharge). You can make an appointment with you primary care doctor in the next 1-2 weeks and GI as previously planned.     Full Code     Diet   Follow this diet upon discharge: Orders Placed This Encounter     Regular Diet Adult       Discharge Medications   Discharge Medication List as of 1/23/2018  2:41 PM      CONTINUE these medications which have CHANGED    Details   omeprazole (PRILOSEC) 20 MG CR capsule Take 1 capsule (20 mg) by mouth daily, Disp-30 capsule, R-1, E-Prescribe         CONTINUE these medications which have NOT CHANGED    Details   mesalamine (LIALDA) 1.2 G EC tablet Take 2,400 mg by mouth daily (with  breakfast), Historical      mesalamine (CANASA) 1000 MG Suppository Place 1,000 mg rectally nightly as needed , Historical           Allergies   Allergies   Allergen Reactions     Indomethacin Other (See Comments)     Possibly indomethacin - it was a medication for gout (not allopurinol or colchicine) and developed an esophageal ulcer

## 2018-01-23 NOTE — PLAN OF CARE
Problem: Patient Care Overview  Goal: Plan of Care/Patient Progress Review  Outcome: Improving  Pt VSS on RA besides elevated DBP in 90s. Pt c/o acid reflux pain, tums and tylenol given x1 with no improvement and zofran x1 with decrease in pain. Pt tolerating regular diet besides reflux pain which is tolerable per patient. Up per self in room. BMs brown, no blood noted. LR infusing at 100.     Plan: possible discharge home tomorrow. Will continue to monitor overnight

## 2018-01-23 NOTE — PLAN OF CARE
Problem: Patient Care Overview  Goal: Plan of Care/Patient Progress Review  Outcome: Adequate for Discharge Date Met: 01/23/18  D/I/A: Patient A & O X 4, VSS no respiratory distress noted, and denies pain. Patient stable has discharge order to go home . Patient aware and agreeable with plan. Writer reviewed discharge instructions with patient and instruct patient to f/u with MD as scheduled. Patient verbalized understanding .patient has order for Omeprazole and discharge Pharmacy report that Pt insurance is not approved for meds, writer page placed to primary team Brittney Cantor, to DR. Stockton, ( [phone]) MD aware . Patient State He will by from [facility] when he go home. Patient left the unit at ~ 1520, and patient took all his belonging with him.

## 2018-01-23 NOTE — PLAN OF CARE
Problem: Patient Care Overview  Goal: Plan of Care/Patient Progress Review  Cares continued overnight. Pt VSS on RA. No c/o pain or nausea overnight. Pt sleeping between cares. LR continues at 100mL/hr.     Plan: probable discharge home today pending labs and tolerating diet.

## 2018-01-24 ENCOUNTER — CARE COORDINATION (OUTPATIENT)
Dept: CARE COORDINATION | Facility: CLINIC | Age: 28
End: 2018-01-24

## 2018-01-26 LAB
BACTERIA SPEC CULT: NO GROWTH
BACTERIA SPEC CULT: NO GROWTH
Lab: NORMAL
Lab: NORMAL
SPECIMEN SOURCE: NORMAL
SPECIMEN SOURCE: NORMAL

## 2018-01-27 LAB
BACTERIA SPEC CULT: NO GROWTH
BACTERIA SPEC CULT: NO GROWTH
SPECIMEN SOURCE: NORMAL
SPECIMEN SOURCE: NORMAL

## 2018-09-12 ENCOUNTER — AMBULATORY - HEALTHEAST (OUTPATIENT)
Dept: CARDIOLOGY | Facility: CLINIC | Age: 28
End: 2018-09-12

## 2018-09-12 ENCOUNTER — RECORDS - HEALTHEAST (OUTPATIENT)
Dept: ADMINISTRATIVE | Facility: OTHER | Age: 28
End: 2018-09-12

## 2018-09-13 ENCOUNTER — OFFICE VISIT - HEALTHEAST (OUTPATIENT)
Dept: CARDIOLOGY | Facility: CLINIC | Age: 28
End: 2018-09-13

## 2018-09-13 DIAGNOSIS — R07.89 CHEST WALL PAIN: ICD-10-CM

## 2018-09-24 ENCOUNTER — HOSPITAL ENCOUNTER (OUTPATIENT)
Dept: CARDIOLOGY | Facility: HOSPITAL | Age: 28
Discharge: HOME | End: 2018-09-24
Attending: INTERNAL MEDICINE

## 2018-09-24 DIAGNOSIS — R07.89 CHEST WALL PAIN: ICD-10-CM

## 2018-09-24 LAB
CV STRESS CURRENT BP HE: NORMAL
CV STRESS CURRENT HR HE: 100
CV STRESS CURRENT HR HE: 104
CV STRESS CURRENT HR HE: 104
CV STRESS CURRENT HR HE: 105
CV STRESS CURRENT HR HE: 106
CV STRESS CURRENT HR HE: 107
CV STRESS CURRENT HR HE: 108
CV STRESS CURRENT HR HE: 109
CV STRESS CURRENT HR HE: 111
CV STRESS CURRENT HR HE: 114
CV STRESS CURRENT HR HE: 120
CV STRESS CURRENT HR HE: 122
CV STRESS CURRENT HR HE: 122
CV STRESS CURRENT HR HE: 124
CV STRESS CURRENT HR HE: 125
CV STRESS CURRENT HR HE: 126
CV STRESS CURRENT HR HE: 126
CV STRESS CURRENT HR HE: 133
CV STRESS CURRENT HR HE: 137
CV STRESS CURRENT HR HE: 138
CV STRESS CURRENT HR HE: 139
CV STRESS CURRENT HR HE: 139
CV STRESS CURRENT HR HE: 140
CV STRESS CURRENT HR HE: 141
CV STRESS CURRENT HR HE: 156
CV STRESS CURRENT HR HE: 158
CV STRESS CURRENT HR HE: 162
CV STRESS CURRENT HR HE: 163
CV STRESS CURRENT HR HE: 171
CV STRESS CURRENT HR HE: 171
CV STRESS CURRENT HR HE: 90
CV STRESS CURRENT HR HE: 94
CV STRESS DEVIATION TIME HE: NORMAL
CV STRESS ECHO PERCENT HR HE: NORMAL
CV STRESS EXERCISE STAGE HE: NORMAL
CV STRESS FINAL RESTING BP HE: NORMAL
CV STRESS FINAL RESTING HR HE: 100
CV STRESS MAX HR HE: 171
CV STRESS MAX TREADMILL GRADE HE: 14
CV STRESS MAX TREADMILL SPEED HE: 3.4
CV STRESS PEAK DIA BP HE: NORMAL
CV STRESS PEAK SYS BP HE: NORMAL
CV STRESS PHASE HE: NORMAL
CV STRESS PROTOCOL HE: NORMAL
CV STRESS RESTING PT POSITION HE: NORMAL
CV STRESS RESTING PT POSITION HE: NORMAL
CV STRESS ST DEVIATION AMOUNT HE: NORMAL
CV STRESS ST DEVIATION ELEVATION HE: NORMAL
CV STRESS ST EVELATION AMOUNT HE: NORMAL
CV STRESS TEST TYPE HE: NORMAL
CV STRESS TOTAL STAGE TIME MIN 1 HE: NORMAL
ECHO EJECTION FRACTION ESTIMATED: 55 %
STRESS ECHO BASELINE BP: NORMAL
STRESS ECHO BASELINE HR: 89
STRESS ECHO CALCULATED PERCENT HR: 90 %
STRESS ECHO LAST STRESS BP: NORMAL
STRESS ECHO LAST STRESS HR: 171
STRESS ECHO POST ESTIMATED WORKLOAD: 10.3
STRESS ECHO POST EXERCISE DUR MIN: 8
STRESS ECHO POST EXERCISE DUR SEC: 59
STRESS ECHO TARGET HR: 162

## 2019-10-02 NOTE — Clinical Note
Admitting Physician: RON DICKINSON [[id number]]   Bed request comments: will need iso due to diarrhea   normal...

## 2020-01-10 ENCOUNTER — AMBULATORY - HEALTHEAST (OUTPATIENT)
Dept: LAB | Facility: HOSPITAL | Age: 30
End: 2020-01-10

## 2020-01-10 DIAGNOSIS — K51.00 ULCERATIVE CHRONIC PANCOLITIS WITHOUT COMPLICATIONS (H): ICD-10-CM

## 2020-01-10 LAB
ALBUMIN SERPL-MCNC: 3.9 G/DL (ref 3.5–5)
ALP SERPL-CCNC: 73 U/L (ref 45–120)
ALT SERPL W P-5'-P-CCNC: 59 U/L (ref 0–45)
AMYLASE SERPL-CCNC: 100 U/L (ref 5–120)
ANION GAP SERPL CALCULATED.3IONS-SCNC: 7 MMOL/L (ref 5–18)
AST SERPL W P-5'-P-CCNC: 29 U/L (ref 0–40)
BILIRUB SERPL-MCNC: 0.4 MG/DL (ref 0–1)
BUN SERPL-MCNC: 13 MG/DL (ref 8–22)
C REACTIVE PROTEIN LHE: 1.2 MG/DL (ref 0–0.8)
CALCIUM SERPL-MCNC: 9.1 MG/DL (ref 8.5–10.5)
CHLORIDE BLD-SCNC: 108 MMOL/L (ref 98–107)
CO2 SERPL-SCNC: 29 MMOL/L (ref 22–31)
CREAT SERPL-MCNC: 0.93 MG/DL (ref 0.7–1.3)
GFR SERPL CREATININE-BSD FRML MDRD: >60 ML/MIN/1.73M2
GLUCOSE BLD-MCNC: 107 MG/DL (ref 70–125)
LIPASE SERPL-CCNC: 82 U/L (ref 0–52)
POTASSIUM BLD-SCNC: 4.6 MMOL/L (ref 3.5–5)
PROT SERPL-MCNC: 7 G/DL (ref 6–8)
SODIUM SERPL-SCNC: 144 MMOL/L (ref 136–145)

## 2020-01-17 LAB
Lab: 105 (ref 230–400)
Lab: 5447
Lab: NORMAL
Lab: NORMAL

## 2020-03-26 ENCOUNTER — COMMUNICATION - HEALTHEAST (OUTPATIENT)
Dept: SCHEDULING | Facility: CLINIC | Age: 30
End: 2020-03-26

## 2020-09-28 ENCOUNTER — RECORDS - HEALTHEAST (OUTPATIENT)
Dept: ADMINISTRATIVE | Facility: OTHER | Age: 30
End: 2020-09-28

## 2020-10-01 ENCOUNTER — COMMUNICATION - HEALTHEAST (OUTPATIENT)
Dept: ADMINISTRATIVE | Facility: CLINIC | Age: 30
End: 2020-10-01

## 2020-10-22 ENCOUNTER — AMBULATORY - HEALTHEAST (OUTPATIENT)
Dept: ENDOCRINOLOGY | Facility: CLINIC | Age: 30
End: 2020-10-22

## 2020-10-22 DIAGNOSIS — E11.9 DM (DIABETES MELLITUS) (H): ICD-10-CM

## 2020-10-27 ENCOUNTER — AMBULATORY - HEALTHEAST (OUTPATIENT)
Dept: LAB | Facility: HOSPITAL | Age: 30
End: 2020-10-27

## 2020-10-27 DIAGNOSIS — E11.9 DM (DIABETES MELLITUS) (H): ICD-10-CM

## 2020-10-27 LAB
ANION GAP SERPL CALCULATED.3IONS-SCNC: 7 MMOL/L (ref 5–18)
BUN SERPL-MCNC: 6 MG/DL (ref 8–22)
CALCIUM SERPL-MCNC: 9.4 MG/DL (ref 8.5–10.5)
CHLORIDE BLD-SCNC: 108 MMOL/L (ref 98–107)
CO2 SERPL-SCNC: 25 MMOL/L (ref 22–31)
CREAT SERPL-MCNC: 1.07 MG/DL (ref 0.7–1.3)
CREAT UR-MCNC: 232.5 MG/DL
GFR SERPL CREATININE-BSD FRML MDRD: >60 ML/MIN/1.73M2
GLUCOSE BLD-MCNC: 90 MG/DL (ref 70–125)
HBA1C MFR BLD: 6.8 %
LDLC SERPL CALC-MCNC: 43 MG/DL
MICROALBUMIN UR-MCNC: 15.29 MG/DL (ref 0–1.99)
MICROALBUMIN/CREAT UR: 65.8 MG/G
POTASSIUM BLD-SCNC: 4.4 MMOL/L (ref 3.5–5)
SODIUM SERPL-SCNC: 140 MMOL/L (ref 136–145)

## 2020-11-17 ENCOUNTER — OFFICE VISIT - HEALTHEAST (OUTPATIENT)
Dept: EDUCATION SERVICES | Facility: CLINIC | Age: 30
End: 2020-11-17

## 2020-11-17 DIAGNOSIS — E11.9 TYPE 2 DIABETES MELLITUS WITHOUT COMPLICATION, WITHOUT LONG-TERM CURRENT USE OF INSULIN (H): ICD-10-CM

## 2021-01-13 ENCOUNTER — OFFICE VISIT - HEALTHEAST (OUTPATIENT)
Dept: EDUCATION SERVICES | Facility: CLINIC | Age: 31
End: 2021-01-13

## 2021-01-13 DIAGNOSIS — E11.9 DIABETES MELLITUS, TYPE 2 (H): ICD-10-CM

## 2021-06-02 VITALS — BODY MASS INDEX: 32.77 KG/M2 | WEIGHT: 203 LBS

## 2021-06-07 NOTE — TELEPHONE ENCOUNTER
[facility]  He sees another PCP at other group.    Sherron Hollingsworth RN  [facility] Triage/refill nurse

## 2021-06-11 NOTE — TELEPHONE ENCOUNTER
Date: 10/29/2020 Status: [facility]   Time: 2:40 PM Length: 40   Visit Type: VIDEO VISIT NEW [6701] Copay: $0.00   Provider: Coleen Bergeron NP

## 2021-06-11 NOTE — TELEPHONE ENCOUNTER
9/28/2020 11:35am inside ENDO consult  Referring & Location: [facility] Office - [phone]  DX: New DM

## 2021-06-13 NOTE — PROGRESS NOTES
Patient has given verbal consent to a Video visit? Yes    Patient would like the video invitation sent by: Send to e-mail at: [email or web address]    Type of service:  Video Visit  Originating Location (pt. Location): Home  Distant Location (provider location):  [facility]   Mode of Communication:  Video Conference via [employer]  Video Start Time:  2:00  Video End Time (time video stopped): 3:00 pm    Patient would like to receive their AVS by AVS Preference: Juan.     Assessment: Cresencio has new diagnosis of type 2 diabetes.  Taking 500 mg metformin bid.   Since diagnosis, he has made significant changes to his diet and exercise.  He has elminated sugar soda,  eating smaller portions food, using Caprice on phone to track calories (1800 Kcal) and grams carbs. He reports a 15 lb weight loss since diagnosis on  10/27/20.   He has increased activity;  2 days per week cardio and 5 days per week weight training, squats, some aerobic activity for 30 minutes    His father has type 2 diabetes.   He is checking BG levels:   FBS  all <95 mg/dl,   2 hours post < 130 mg/dl.    He is having some challenges getting enough blood for test.       Plan:  Reviewed diabetes basics.  Discussed BG, nutrition and activity guidelines.  Recommnend checking FBS and one other 2 hour post meal reading at varying times.   Will mail educational materials.  Congratulated him on all healthy behavior changes.    To return  2 months.          Education Assessment: Readiness to learn:  acceptance           Plan / Patient Education:     Goals:  30 minutes activity 5 days per week              Test BG levels      Subjective:       Cresencio Del Rosario is referred by Cecille Bergeron for Diabetes Education.         Monitoring   Meter (per above goals): Assessed, Discussed, Literature Provided - mailed  Monitoring: Assessed, Discussed, Literature Provided  BG goals:  Assessed, Discussed, Literature Provided    Nutrition  Management  Nutrition Management: Assessed, Discussed, Literature Provided  Weight: Assessed, Assessed, Discussed, Literature Provided  Portions/Balance: Assessed, Discussed, Literature Provided  Carb ID/Count: Assessed, Discussed, Literature Provided  Physical Activity: Assessed, Discussed, Literature Provided  Medications: Assessed, Discussed    Diabetes Disease Process: Assessed, Discussed, Literature Provided    Acute Complications: Prevent, Detect, Treat:  Hypoglycemia: Assessed, Discussed, Literature Provided  Hyperglycemia: Assessed, Discussed, Literature Provided    Chronic Complications  Goal Setting and Problem Solving: Assessed, Discussed, Literature Provided  Barriers: Assessed, Discussed, Literature Provided  Psychosocial Adjustments: Assessed, Discussed, Literature Provided    Time spent with the patient: 60 minutes for diabetes education and counseling.   Previous Education: no  Visit Type:OSMIN Morton  11/17/2020    I agree with the aforementioned diabetes plan.  Coleen VIGIL [facility]  11/18/2020  8:15 AM

## 2021-06-14 NOTE — PROGRESS NOTES
Type of Service: Telephone Visit    Patient would like to receive their AVS by email.      Assessment: Cresencio has new diagnosis of type 2 diabetes, this is follow up visit.  Taking 500 mg metformin bid.   Since diagnosis in October, 2020, he has made significant changes to his diet and exercise.  He has elminated sugar soda,  eating smaller portions food, using Caprice on phone to track calories (1800 Kcal) and grams carbs and finds it helpful to see the numbers.   He reports a 28 lb weight loss since diagnosis on  10/27/20.  He does have fitbit and tracks activity.   He has been jogging in place for 20-30 minutes;  goal is 5 days per week.  He is checking BG levels:   FBS  all <95 mg/dl,   2 hours post < 130 mg/dl,  occ will get occ 150-180 mg/dl.   He reports recent Alc was 5.4% in January 2021.   He did receive diabetes education materials.          Plan:  Congratulated him on positive health changes.  Discussed strategies for maintaining healthy lifestyle.  Reviewed BG goals.  Recommnend to continue to checking FBS and one other 2 hour post meal reading at varying times at least 4 days per week.   Discussed A, B, C's diabetes management,  Reviewed skin, dental, foot and eye care.     Education Assessment: Readiness to learn:  acceptance     Plan / Patient Education:      Goals:  30 minutes activity 5 days per week- complete              Test BG levels - complete        Subjective:       Cresencio Del Rosario is referred by Cecille Bergeron for Diabetes Education.      Monitoring   Meter (per above goals): Assessed, Discussed, Literature Provided - mailed  Monitoring: Assessed, Discussed, Literature Provided  BG goals:  Assessed, Discussed, Literature Provided     Nutrition Management  Nutrition Management: Assessed, Discussed, Literature Provided  Weight: Assessed, Assessed, Discussed, Literature Provided  Portions/Balance: Assessed, Discussed, Literature Provided  Carb ID/Count: Assessed, Discussed, Literature Provided  Physical  Activity: Assessed, Discussed, Literature Provided  Medications: Assessed, Discussed     Diabetes Disease Process: Assessed, Discussed, Literature Provided  ABC's- assessed, discussed  Foot, eye, skin, dental care-  assessed, discussed     Acute Complications: Prevent, Detect, Treat:  Hypoglycemia: Assessed, Discussed, Literature Provided  Hyperglycemia: Assessed, Discussed, Literature Provided     Chronic Complications  Goal Setting and Problem Solving: Assessed, Discussed, Literature Provided - talked about sustaining these healthy changes  Barriers: Assessed, Discussed, Literature Provided  Psychosocial Adjustments: Assessed, Discussed, Literature Provided     Time spent with the patient: 30 minutes for diabetes education and counseling.   Previous Education: no  Visit Type:DSMT     I agree with the aforementioned diabetes plan.  Coleen VIGIL [facility]  1/13/2021  12:14 PM

## 2021-06-20 NOTE — PROGRESS NOTES
[facility] Office Consult     Assessment:     1. Chest wall pain -somewhat atypical, in that it is been going on for 10 years, not associated with activity, and lasts only about 30 seconds.  No risk factors consider normal cholesterol, normal blood pressure, no medications for either, and lack of cigarette smoking nor family history.  Will however perform stress echo looking for structural heart abnormalities although nothing heard on exam as well as EKG portion looking for ischemia.  If abnormal will discuss further.   2.  Ulcerative colitis- current therapy deferred to GI specialist.     Plan:   1.  Stress echo and address if abnormal.    History of Present Illness:   Thank you for asking the [facility] team to see Cresencio Del Rosario a 27 y.o.  male  in consultation  to evaluate chest pain.   Patient tells me for about 10 years or so now he has had a discomfort involving the left side of his chest, almost like an ache, that will come on when he is relaxed and still, relaxed with mental stress, or light physical activity.  He states it does not come on during heavy physical activity.  It does appear to be left retrosternal deep without any radiation, shortness of breath, diaphoresis, or nausea or vomiting.  He states will go away within about 30 seconds.  He tells me about every 2-4 weeks will have one episode and this is increased over the last several months.    Past Medical History:   Ulcerative colitis  Heavy alcohol use    Past history is negative for cancer, tuberculosis, diabetes mellitus, myocardial infarction,  rheumatic fever, hypertension, cerebrovascular accident, chronic kidney disease, peptic ulcer disease, chronic obstructive pulmonary disease, or thyroid disorder  and no lipid disorder.    Past Surgical History:   History reviewed. No pertinent surgical history.    Family History:   Family history is negative for coronary artery disease but positive for colon problems in his  father.    Social History:   He lives alone independently, is a full-time computer science student at [employer], reports that he has quit smoking, a pack a day for 4 years quitting 8 years ago. He has quit using smokeless tobacco.  He used to drink heavily, a pint of rum a day but quit this about 8 years ago as well.  The primary care physician is Jalil Travis MD    Meds:   Scheduled Meds:  Current Outpatient Prescriptions   Medication Sig Dispense Refill     balsalazide (COLAZAL) 750 mg capsule Take 3 capsules by mouth 3 (three) times a day.  2     ferrous sulfate 325 (65 FE) MG tablet Take 142 mg by mouth daily.       No current facility-administered medications for this visit.      PRN Meds:.    Allergies:   Indomethacin    Objective:      Physical Exam  203 lb (92.1 kg)     There is no height or weight on file to calculate BMI.  /72 (Patient Site: Right Arm, Patient Position: Sitting, Cuff Size: Adult Large)  Pulse 60  Resp 16  Wt 203 lb (92.1 kg)    General Appearance:   Alert, cooperative and in no acute distress.   HEENT:  No scleral icterus; the mucous membranes were pink and moist.   Neck: JVP normal. No thyromegaly. No HJR   Chest: The spine was straight. The chest was symmetric.   Lungs:   Respirations unlabored; the lungs are clear to auscultation.   Cardiovascular:   S1 and S2 without murmur, clicks or rubs. Brachial, radial, carotid and posterior tibial pulses are intact and symetrical.  No carotid bruits noted   Abdomen:  No organomegaly, masses, bruits, or tenderness. Bowels sounds are present   Extremities: No cyanosis, clubbing, or edema.   Skin: No xanthelasma.   Neurologic: Mood and affect are appropriate.         Lab Reviewed Personally by myself  No results found for: NA, K, CL, CO2, BUN, CREATININE, GLUCOSE, CALCIUM  No results found for: WBC, HGB, HCT, MCV, PLT  Results found for: CHOL, TRIG, HDL, LDLDIRECT 132, 88, 34, 81  No results found for: BNP    ECG  personally reviewed by myself shows sinus rhythm within normal limits, from primary clinic of the tracing is suboptimal.     Review of Systems:     Review of Systems:   General: WNL  Eyes: WNL  Ears/Nose/Throat: WNL  Lungs: WNL  Heart: Chest Pain  Stomach: WNL  Bladder: WNL  Muscle/Joints: WNL  Skin: WNL  Nervous System: WNL  Mental Health: WNL     Blood: WNL

## 2021-08-14 ENCOUNTER — HEALTH MAINTENANCE LETTER (OUTPATIENT)
Age: 31
End: 2021-08-14

## 2021-10-09 ENCOUNTER — HEALTH MAINTENANCE LETTER (OUTPATIENT)
Age: 31
End: 2021-10-09

## 2021-12-04 ENCOUNTER — HEALTH MAINTENANCE LETTER (OUTPATIENT)
Age: 31
End: 2021-12-04

## 2022-03-26 ENCOUNTER — HEALTH MAINTENANCE LETTER (OUTPATIENT)
Age: 32
End: 2022-03-26

## 2022-07-10 ENCOUNTER — HEALTH MAINTENANCE LETTER (OUTPATIENT)
Age: 32
End: 2022-07-10

## 2022-09-11 ENCOUNTER — HEALTH MAINTENANCE LETTER (OUTPATIENT)
Age: 32
End: 2022-09-11

## 2023-01-10 ENCOUNTER — HOSPITAL ENCOUNTER (EMERGENCY)
Facility: HOSPITAL | Age: 33
Discharge: HOME | End: 2023-01-11
Attending: EMERGENCY MEDICINE | Admitting: EMERGENCY MEDICINE
Payer: COMMERCIAL

## 2023-01-10 ENCOUNTER — APPOINTMENT (OUTPATIENT)
Dept: RADIOLOGY | Facility: HOSPITAL | Age: 33
End: 2023-01-10
Attending: STUDENT IN AN ORGANIZED HEALTH CARE EDUCATION/TRAINING PROGRAM
Payer: COMMERCIAL

## 2023-01-10 VITALS
HEIGHT: 66 IN | BODY MASS INDEX: 32.14 KG/M2 | SYSTOLIC BLOOD PRESSURE: 169 MMHG | TEMPERATURE: 97.9 F | OXYGEN SATURATION: 99 % | WEIGHT: 200 LBS | RESPIRATION RATE: 18 BRPM | HEART RATE: 91 BPM | DIASTOLIC BLOOD PRESSURE: 100 MMHG

## 2023-01-10 DIAGNOSIS — M25.522 LEFT ELBOW PAIN: ICD-10-CM
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
## 2023-01-10 PROCEDURE — 73090 X-RAY EXAM OF FOREARM: CPT | Mod: LT

## 2023-01-10 PROCEDURE — 29105 APPLICATION LONG ARM SPLINT: CPT | Mod: LT

## 2023-01-10 PROCEDURE — 99284 EMERGENCY DEPT VISIT MOD MDM: CPT

## 2023-01-10 NOTE — Clinical Note
Cresencio Del Rosario was seen and treated in our emergency department on 1/10/2023.  He may return to work on 01/13/2023.       If you have any questions or concerns, please don't hesitate to call.      Dhara Cam MD

## 2023-01-11 ASSESSMENT — ENCOUNTER SYMPTOMS
WOUND: 0
ARTHRALGIAS: 1
JOINT SWELLING: 1
WEAKNESS: 0
NUMBNESS: 0
HEADACHES: 0

## 2023-01-11 ASSESSMENT — ACTIVITIES OF DAILY LIVING (ADL): ADLS_ACUITY_SCORE: 35

## 2023-01-11 NOTE — ED TRIAGE NOTES
Slipped and fell on the ice. Fell onto left arm. No loc. Is not n blood thinners. Pain left upper forearm. Took 3 tylenol. Able to move fingers. States blood sugar ok. Strong pp     Triage Assessment     Row Name 01/10/23 5310       Triage Assessment (Adult)    Airway WDL WDL

## 2023-01-11 NOTE — ED PROVIDER NOTES
EMERGENCY DEPARTMENT ENCOUNTER      NAME: Cresencio Del Rosario  AGE: 32 year old male  YOB: 1990  MRN: [id number]  EVALUATION DATE & TIME: 1/10/2023 11:28 PM    PCP: [facility] [place]    ED PROVIDER: Dhara Cam M.D.        Chief Complaint   Patient presents with     Fall     Arm pain         FINAL IMPRESSION:    1. Fall due to slipping on ice or snow, initial encounter    2. Left elbow pain            MEDICAL DECISION MAKIN year old male who presents emergency department for mechanical fall on the ice and landing onto an outstretched left hand complains of elbow pain.  X-ray without definitive significant fracture but given degree of pain with range of motion patient placed in a long-arm splint and discharged home in a sling to follow-up with orthopedics as an outpatient.    ED COURSE:  12:23 AM I met with the patient to gather history and perform my exam. ED course and treatment discussed.    12:41 Placed a splint on the patient's elbow.  Possible chip fracture given mechanism and given degree of pain at this time is to place in a splint and have her follow-up with orthopedics this week for reevaluation.    1:00 AM We discussed the plan for discharge and the patient is agreeable. Reviewed supportive cares, symptomatic treatment, outpatient follow up, and reasons to return to the Emergency Department. Patient to be discharged by ED RN.  Patient did have a little tingling in his fingers after splint placement and this immediately was resolved with loosening the most distal Ace wrap.  It was reapplied and no recurrence of symptoms.  Neurovascular tact distally.  He was discharged home in stable condition.  No other traumatic injuries appreciated.  He denied any pain at the wrist, snuffbox area or other things to suggest a wrist or scaphoid injury.  No hand pain, shoulder pain, neck or back pain.  No scapular pain or clavicle pain.  This sounds consistent with mechanical type  "fall on the ice and was discharged home to follow-up with orthopedics as an outpatient.  At this time I think a true tendon rupture of the left upper extremity is less likely.  No obvious significant tumors, radius or ulna fracture appreciated.  No snuffbox tenderness.      COVID-19 PPE worn during patient evaluation:  Mask: n95 and homemade masks   Eye Protection: goggles   Gown: none   Hair cover: yes  Face shield: none   Patient wearing a mask: yes     At the conclusion of the encounter I discussed the results of all of the tests and the disposition. Their questions were answered. The patient (and any family present) acknowledged understanding and were agreeable with the care plan.      CONSULTANTS:  none        MEDICATIONS GIVEN IN THE EMERGENCY:  Medications - No data to display        NEW PRESCRIPTIONS STARTED AT TODAY'S ER VISIT     Medication List      There are no discharge medications for this visit.             CONDITION:  stable        DISPOSITION:  discharge home         =================================================================  =================================================================  TRIAGE ASSESSMENT:  Slipped and fell on the ice. Fell onto left arm. No loc. Is not n blood thinners. Pain left upper forearm. Took 3 tylenol. Able to move fingers. States blood sugar ok. Strong pp      ED Triage Vitals [01/10/23 2206]   Enc Vitals Group      BP (!) 169/100      Pulse 91      Resp 18      Temp 97.9  F (36.6  C)      Temp src Oral      SpO2 99 %      Weight 90.7 kg (200 lb)      Height 1.676 m (5' 6\")      ================================================================  ================================================================    HPI    Patient information was obtained from: Patient    Use of Intrepreter: N/A      Cresencio NERI Del Rosario is a 32 year old male with no pertinent history who presents to the ER with complaints of left elbow injury.    The patient reports that tonight he had " "slipped and fallen on ice and onto his left hand (FOOSH mechanism), but he is unsure if his elbow \"bent it out of shape\". Initially the pain brought tears, but now the pain has improved. He describes the pain as intermittent and is a \"burning\" type of pain that is worsened with movement. In addition, the patient appeared to have slight bruising and swelling around his lateral elbow. Per nurse triage report, the patient had taken 3 tylenol and he is not currently not on blood thinners. Patient stated that the tylenol he took had given some relief for his pain.     He denies any head trauma, loss of consciousness, or pain in his upper left shoulder or forearm. He does not have any allergies to medications. Patient also reported a personal history of chronic ulcerative colitis. No other complaints at this time.     Denies any hand or wrist pain.    REVIEW OF SYSTEMS  Review of Systems   HENT:        Negative for head trauma.   Musculoskeletal: Positive for arthralgias (left medial elbow ) and joint swelling (mild, near left lateral elbow).        Negative for pain in upper left shoulder or forearm.  Negative for wrist and hand pain.   Skin: Negative for wound.        Positive for left elbow bruising.   Neurological: Negative for syncope, weakness, numbness and headaches.     PAST MEDICAL HISTORY:  Past Medical History:   Diagnosis Date     Colitis      Ulcer of upper gastrointestinal tract          PAST SURGICAL HISTORY:  Past Surgical History:   Procedure Laterality Date     COLONOSCOPY       SHOULDER SURGERY  2009         CURRENT MEDICATIONS:    Prior to Admission medications    Medication Sig Start Date End Date Taking? Authorizing Provider   mesalamine (CANASA) 1000 MG Suppository Place 1,000 mg rectally nightly as needed     Unknown, Entered By History   mesalamine (LIALDA) 1.2 G EC tablet Take 2,400 mg by mouth daily (with breakfast)    Unknown, Entered By History   omeprazole (PRILOSEC) 20 MG CR capsule Take 1 " "capsule (20 mg) by mouth daily 1/23/18   Mahin Camarena MD         ALLERGIES:  Allergies   Allergen Reactions     Indomethacin Other (See Comments)     Possibly indomethacin - it was a medication for gout (not allopurinol or colchicine) and developed an esophageal ulcer         FAMILY HISTORY:  History reviewed. No pertinent family history.      SOCIAL HISTORY:  Social History     Socioeconomic History     Marital status: Single   Tobacco Use     Smoking status: Former     Smokeless tobacco: Former   Substance and Sexual Activity     Alcohol use: No     Drug use: No         VITALS:  Patient Vitals for the past 24 hrs:   BP Temp Temp src Pulse Resp SpO2 Height Weight   01/10/23 2206 (!) 169/100 97.9  F (36.6  C) Oral 91 18 99 % 1.676 m (5' 6\") 90.7 kg (200 lb)       Wt Readings from Last 3 Encounters:   01/10/23 90.7 kg (200 lb)   09/13/18 92.1 kg (203 lb)   01/22/18 87.9 kg (193 lb 11.2 oz)       CrCl cannot be calculated (Patient's most recent lab result is older than the maximum 30 days allowed.).    PHYSICAL EXAM    Constitutional:  Well developed, Well nourished, NAD, GCS 15  HENT:  Normocephalic, Atraumatic, Bilateral external ears normal, Nose normal. Neck- Supple, No stridor.   Eyes:  PERRL, EOMI, Conjunctiva normal, No discharge.  Respiratory:  Normal breath sounds, No respiratory distress, No wheezing, Speaks full sentences easily. No cough.  Cardiovascular:  Normal heart rate, Regular rhythm, No rubs, No gallops. Chest wall nontender.   GI:  No excessive obesity.  Bowel sounds normal, Soft, No tenderness, No masses, No flank tenderness. No rebound or guarding.   : deferred  Musculoskeletal: 2+ radial pulses. No edema  No cyanosis, No clubbing. Good range of motion in all major joints, except decr ROM at elbow due to pain. No major deformities noted. No tenderness of the CTLS spine.  No tenderness at the wrist or hand.  No snuffbox tenderness.  Integument:  Warm, Dry, No erythema, No rash.  No " petechiae.   Neurologic:  Alert & oriented x 3, grossly normal motor function at left elbow (he is able to hold in flexion against resistance.  He has more pain with tricep extension but does have some ability. Normal sensory function, No focal deficits noted.  Psychiatric:  Affect normal, Cooperative         LAB:  none    RADIOLOGY:  Reviewed all pertinent imaging. Please see official radiology report.    XR Forearm Left 2 Views   Final Result   IMPRESSION: Tiny ossific density along the dorsal aspect of the olecranon may be an enthesophyte of the triceps insertion. An acute avulsion fracture is in the differential diagnosis. Correlation with clinical examination recommended. No other evidence    for fracture or dislocation.            EKG:    None.        PROCEDURES:    PROCEDURE: Splint Placement   INDICATIONS: Possible tiny avulsion fracture near the olecranon.   PROCEDURE PROVIDER: Dr Dhara Cam   NOTE:  A Long arm splint made of Orthoglass was applied to the Left upper extremity by the above provider. As noted in the physical exam, distal CMS was intact prior to placement. The splint was checked and the fit was adjusted to ensure proper positioning after placement. Sensation and circulation, as well as motor function, are unchanged after splint placement and the patient is more comfortable with the splint in place.       Medical Decision Making    History:    Supplemental history from: Documented in HPI, if applicable    External Record(s) reviewed: Documented in HPI, if applicable.    Work Up:    Chart documentation includes differential considered and any EKGs or imaging independently interpreted by provider.    In additional to work up documented, I considered the following work up: See chart documentation, if applicable.    External consultation:    Discussion of management with another provider: See chart documentation, if applicable    Complicating factors:    Care impacted by chronic illness:  N/A    Care affected by social determinants of health: N/A    Disposition considerations: Discharge. No recommendations on prescription strength medication(s). N/A.        I, Ike Stewart , am serving as a scribe to document services personally performed by Dr. Dhara Cam based on my observation and the provider's statements to me. I, Dr. Dhara Cam MD attest that Ike Stewart is acting in a scribe capacity, has observed my performance of the services and has documented them in accordance with my direction.        Dhara Cam M.D. [facility]  Emergency Medicine and Medical Toxicology  Formerly [facility] EMERGENCY DEPARTMENT  [facility] [facility] [id number] [phone]  Dept: [phone]           Dhara Cam MD  01/11/23 0401

## 2023-01-23 ENCOUNTER — HEALTH MAINTENANCE LETTER (OUTPATIENT)
Age: 33
End: 2023-01-23

## 2023-05-06 ENCOUNTER — HEALTH MAINTENANCE LETTER (OUTPATIENT)
Age: 33
End: 2023-05-06

## 2023-10-07 ENCOUNTER — HEALTH MAINTENANCE LETTER (OUTPATIENT)
Age: 33
End: 2023-10-07

## 2024-11-30 ENCOUNTER — HEALTH MAINTENANCE LETTER (OUTPATIENT)
Age: 34
End: 2024-11-30